# Patient Record
Sex: MALE | Race: WHITE | NOT HISPANIC OR LATINO | Employment: FULL TIME | URBAN - METROPOLITAN AREA
[De-identification: names, ages, dates, MRNs, and addresses within clinical notes are randomized per-mention and may not be internally consistent; named-entity substitution may affect disease eponyms.]

---

## 2018-04-12 ENCOUNTER — HOSPITAL ENCOUNTER (OUTPATIENT)
Dept: PREADMISSION TESTING | Facility: OTHER | Age: 68
Discharge: HOME | End: 2018-04-12
Attending: ORTHOPAEDIC SURGERY
Payer: MEDICARE

## 2018-04-12 VITALS
HEIGHT: 72 IN | SYSTOLIC BLOOD PRESSURE: 153 MMHG | OXYGEN SATURATION: 98 % | TEMPERATURE: 98 F | DIASTOLIC BLOOD PRESSURE: 93 MMHG | WEIGHT: 210 LBS | HEART RATE: 94 BPM | BODY MASS INDEX: 28.44 KG/M2

## 2018-04-12 DIAGNOSIS — I10 HTN (HYPERTENSION): ICD-10-CM

## 2018-04-12 LAB
ANION GAP SERPL CALC-SCNC: 8 MMOL/L
BASOPHILS # BLD AUTO: 0.04 K/UL
BASOPHILS NFR BLD: 0.4 %
BUN SERPL-MCNC: 22 MG/DL
CALCIUM SERPL-MCNC: 9.7 MG/DL
CHLORIDE SERPL-SCNC: 108 MMOL/L
CO2 SERPL-SCNC: 26 MMOL/L
CREAT SERPL-MCNC: 1.1 MG/DL
DIFFERENTIAL METHOD: ABNORMAL
EOSINOPHIL # BLD AUTO: 0.4 K/UL
EOSINOPHIL NFR BLD: 4.2 %
ERYTHROCYTE [DISTWIDTH] IN BLOOD BY AUTOMATED COUNT: 13.7 %
EST. GFR  (AFRICAN AMERICAN): >60 ML/MIN/1.73 M^2
EST. GFR  (NON AFRICAN AMERICAN): >60 ML/MIN/1.73 M^2
GLUCOSE SERPL-MCNC: 89 MG/DL
HCT VFR BLD AUTO: 40.6 %
HGB BLD-MCNC: 13.6 G/DL
LYMPHOCYTES # BLD AUTO: 1.7 K/UL
LYMPHOCYTES NFR BLD: 18.5 %
MCH RBC QN AUTO: 32.6 PG
MCHC RBC AUTO-ENTMCNC: 33.5 G/DL
MCV RBC AUTO: 97 FL
MONOCYTES # BLD AUTO: 0.7 K/UL
MONOCYTES NFR BLD: 7.4 %
NEUTROPHILS # BLD AUTO: 6.5 K/UL
NEUTROPHILS NFR BLD: 69.3 %
PLATELET # BLD AUTO: 186 K/UL
PMV BLD AUTO: 10.1 FL
POTASSIUM SERPL-SCNC: 4.6 MMOL/L
RBC # BLD AUTO: 4.17 M/UL
SODIUM SERPL-SCNC: 142 MMOL/L
WBC # BLD AUTO: 9.37 K/UL

## 2018-04-12 PROCEDURE — 85025 COMPLETE CBC W/AUTO DIFF WBC: CPT

## 2018-04-12 PROCEDURE — 36415 COLL VENOUS BLD VENIPUNCTURE: CPT

## 2018-04-12 PROCEDURE — 93010 ELECTROCARDIOGRAM REPORT: CPT | Mod: ,,, | Performed by: INTERNAL MEDICINE

## 2018-04-12 PROCEDURE — 80048 BASIC METABOLIC PNL TOTAL CA: CPT

## 2018-04-12 PROCEDURE — 93005 ELECTROCARDIOGRAM TRACING: CPT

## 2018-04-12 RX ORDER — LEVOTHYROXINE SODIUM 75 UG/1
75 TABLET ORAL DAILY
COMMUNITY

## 2018-04-12 RX ORDER — LISINOPRIL 20 MG/1
10 TABLET ORAL DAILY
COMMUNITY

## 2018-04-12 RX ORDER — SODIUM CHLORIDE, SODIUM LACTATE, POTASSIUM CHLORIDE, CALCIUM CHLORIDE 600; 310; 30; 20 MG/100ML; MG/100ML; MG/100ML; MG/100ML
INJECTION, SOLUTION INTRAVENOUS CONTINUOUS
Status: CANCELLED | OUTPATIENT
Start: 2018-04-12

## 2018-04-12 RX ORDER — SIMVASTATIN 20 MG/1
20 TABLET, FILM COATED ORAL NIGHTLY
COMMUNITY

## 2018-04-12 RX ORDER — FAMOTIDINE 20 MG/1
20 TABLET, FILM COATED ORAL
Status: CANCELLED | OUTPATIENT
Start: 2018-04-12 | End: 2018-04-12

## 2018-04-12 NOTE — DISCHARGE INSTRUCTIONS
PRE-ADMIT TESTING -  [phone]    [address]  [place] [facility]          Your surgery has been scheduled at [facility]. We are pleased to have the opportunity to serve you. For Further Information please call [phone].    On the day of surgery please report to the Information Desk on the 1st floor.    · CONTACT YOUR PHYSICIAN'S OFFICE THE DAY PRIOR TO YOUR SURGERY TO OBTAIN YOUR ARRIVAL TIME.     · The evening before surgery do not eat anything after 9 p.m. ( this includes hard candy, chewing gum and mints).  You may only have GATORADE, POWERADE AND WATER  from 9 p.m. until you leave your home.   DO NOT DRINK ANY LIQUIDS ON THE WAY TO THE HOSPITAL.      SPECIAL MEDICATION INSTRUCTIONS: TAKE medications checked off by the Anesthesiologist on your Medication List.    Angiogram Patients: Take medications as instructed by your physician, including aspirin.     Surgery Patients:    If you take ASPIRIN - Your PHYSICIAN/SURGEON will need to inform you IF/OR when you need to stop taking aspirin prior to your surgery.     Do Not take any medications containing IBUPROFEN.  Do Not Wear any make-up or dark nail polish   (especially eye make-up) to surgery. If you come to surgery with makeup on you will be required to remove the makeup or nail polish.    Do not shave your surgical area at least 5 days prior to your surgery. The surgical prep will be performed at the hospital according to Infection Control regulations.    Leave all valuables at home.   Do Not wear any jewelry or watches, including any metal in body piercings.  Contact Lens must be removed before surgery. Either do not wear the contact lens or bring a case and solution for storage.  Please bring a container for eyeglasses or dentures as required.  Bring any paperwork your physician has provided, such as consent forms,  history and physicals, doctor's orders, etc.   Bring comfortable clothes that are loose fitting to wear upon  discharge. Take into consideration the type of surgery being performed.  Maintain your diet as advised per your physician the day prior to surgery.      Adequate rest the night before surgery is advised.   Park in the Parking lot behind the hospital or in the [place] across the street from the parking lot. Parking is complimentary.  If you will be discharged the same day as your procedure, please arrange for a responsible adult to drive you home or to accompany you if traveling by taxi.   YOU WILL NOT BE PERMITTED TO DRIVE OR TO LEAVE THE HOSPITAL ALONE AFTER SURGERY.   It is strongly recommended that you arrange for someone to remain with you for the first 24 hrs following your surgery.       Thank you for your cooperation.  The Staff of [facility].        Bathing Instructions                                                                 Please shower the evening before and morning of your procedure with    ANTIBACTERIAL SOAP. ( DIAL, etc )  Concentrate on the surgical area   for at least 3 minutes and rinse completely. Dry off as usual.   Do not use any deodorant, powder, body lotions, perfume, after shave or    cologne.

## 2018-04-12 NOTE — PRE-PROCEDURE INSTRUCTIONS
Mr. Anguiano called requesting labs be faxed to Dr. Brooks (PCP), he has an appt for 2 pm today. Labs and EKG faxed as requested.

## 2018-04-12 NOTE — PRE-PROCEDURE INSTRUCTIONS
Labs and EKG done, A fib noted on EKG. Dr. White viewed EKG and spoke with pt and his wife. Anesthesia requesting cardiac clearance, pt verbalized understanding and to schedule cardiology appt and inform us of appt date and where to fax EKG and labs.

## 2018-06-07 ENCOUNTER — HOSPITAL ENCOUNTER (OUTPATIENT)
Dept: PREADMISSION TESTING | Facility: OTHER | Age: 68
Discharge: HOME | End: 2018-06-07
Attending: ORTHOPAEDIC SURGERY
Payer: MEDICARE

## 2018-06-07 ENCOUNTER — ANESTHESIA EVENT (OUTPATIENT)
Dept: SURGERY | Facility: OTHER | Age: 68
End: 2018-06-07
Payer: MEDICARE

## 2018-06-07 VITALS
TEMPERATURE: 98 F | DIASTOLIC BLOOD PRESSURE: 87 MMHG | SYSTOLIC BLOOD PRESSURE: 157 MMHG | HEIGHT: 72 IN | OXYGEN SATURATION: 99 % | BODY MASS INDEX: 28.44 KG/M2 | WEIGHT: 210 LBS | HEART RATE: 89 BPM

## 2018-06-07 RX ORDER — FAMOTIDINE 20 MG/1
20 TABLET, FILM COATED ORAL
Status: CANCELLED | OUTPATIENT
Start: 2018-06-07 | End: 2018-06-07

## 2018-06-07 RX ORDER — METOPROLOL TARTRATE 25 MG/1
25 TABLET, FILM COATED ORAL
COMMUNITY
End: 2018-10-09 | Stop reason: CLARIF

## 2018-06-07 RX ORDER — SODIUM CHLORIDE, SODIUM LACTATE, POTASSIUM CHLORIDE, CALCIUM CHLORIDE 600; 310; 30; 20 MG/100ML; MG/100ML; MG/100ML; MG/100ML
INJECTION, SOLUTION INTRAVENOUS CONTINUOUS
Status: CANCELLED | OUTPATIENT
Start: 2018-06-07

## 2018-06-07 RX ORDER — PREGABALIN 75 MG/1
150 CAPSULE ORAL
Status: DISPENSED | OUTPATIENT
Start: 2018-06-07 | End: 2018-06-07

## 2018-06-07 NOTE — DISCHARGE INSTRUCTIONS
PRE-ADMIT TESTING -  [phone]    [address]  [place] [facility]          Your surgery has been scheduled at [facility]. We are pleased to have the opportunity to serve you. For Further Information please call [phone].    On the day of surgery please report to the Information Desk on the 1st floor.    · CONTACT YOUR PHYSICIAN'S OFFICE THE DAY PRIOR TO YOUR SURGERY TO OBTAIN YOUR ARRIVAL TIME.     · The evening before surgery do not eat anything after 9 p.m. ( this includes hard candy, chewing gum and mints).  You may only have GATORADE, POWERADE AND WATER  from 9 p.m. until you leave your home.   DO NOT DRINK ANY LIQUIDS ON THE WAY TO THE HOSPITAL.      SPECIAL MEDICATION INSTRUCTIONS: TAKE medications checked off by the Anesthesiologist on your Medication List.    Angiogram Patients: Take medications as instructed by your physician, including aspirin.     Surgery Patients:    If you take ASPIRIN - Your PHYSICIAN/SURGEON will need to inform you IF/OR when you need to stop taking aspirin prior to your surgery.     Do Not take any medications containing IBUPROFEN.  Do Not Wear any make-up or dark nail polish   (especially eye make-up) to surgery. If you come to surgery with makeup on you will be required to remove the makeup or nail polish.    Do not shave your surgical area at least 5 days prior to your surgery. The surgical prep will be performed at the hospital according to Infection Control regulations.    Leave all valuables at home.   Do Not wear any jewelry or watches, including any metal in body piercings.  Contact Lens must be removed before surgery. Either do not wear the contact lens or bring a case and solution for storage.  Please bring a container for eyeglasses or dentures as required.  Bring any paperwork your physician has provided, such as consent forms,  history and physicals, doctor's orders, etc.   Bring comfortable clothes that are loose fitting to wear upon  discharge. Take into consideration the type of surgery being performed.  Maintain your diet as advised per your physician the day prior to surgery.      Adequate rest the night before surgery is advised.   Park in the Parking lot behind the hospital or in the [place] [facility] across the street from the parking lot. Parking is complimentary.  If you will be discharged the same day as your procedure, please arrange for a responsible adult to drive you home or to accompany you if traveling by taxi.   YOU WILL NOT BE PERMITTED TO DRIVE OR TO LEAVE THE HOSPITAL ALONE AFTER SURGERY.   It is strongly recommended that you arrange for someone to remain with you for the first 24 hrs following your surgery.       Thank you for your cooperation.  The Staff of [facility].        Bathing Instructions                                                                 Please shower the evening before and morning of your procedure with    ANTIBACTERIAL SOAP. ( DIAL, etc )  Concentrate on the surgical area   for at least 3 minutes and rinse completely. Dry off as usual.   Do not use any deodorant, powder, body lotions, perfume, after shave or    cologne.

## 2018-06-07 NOTE — ANESTHESIA PREPROCEDURE EVALUATION
"                                                                                                             06/07/2018  Aníbal Anguiano is a 67 y.o., male.    Anesthesia Evaluation    I have reviewed the Patient Summary Reports.    I have reviewed the Nursing Notes.   I have reviewed the Medications.     Review of Systems  Anesthesia Hx:  No problems with previous Anesthesia  Denies Family Hx of Anesthesia complications.   Denies Personal Hx of Anesthesia complications.   Social:  Non-Smoker    Hematology/Oncology:  Hematology Normal        Cardiovascular:   Hypertension, well controlled Dysrhythmias atrial fibrillation Hx of" extra beats"  Atrial fib found last preop surgery cancelled  Dr Schwarz cardiology at . Placed on beta blocker and eliquis and cleared for surgery    Pulmonary:  Pulmonary Normal    Renal/:  Renal/ Normal     Hepatic/GI:   PUD,    Musculoskeletal:  Musculoskeletal Normal    Neurological:  Neurology Normal    Endocrine:   Hypothyroidism        Physical Exam  General:  Well nourished    Airway/Jaw/Neck:  Airway Findings: Mouth Opening: Normal Tongue: Normal  General Airway Assessment: Adult  Mallampati: II  TM Distance: Normal, at least 6 cm         Dental:  Dental Findings:              Anesthesia Plan  Type of Anesthesia, risks & benefits discussed:  Anesthesia Type:  general  Patient's Preference:   Intra-op Monitoring Plan: standard ASA monitors  Intra-op Monitoring Plan Comments:   Post Op Pain Control Plan: peripheral nerve block and multimodal analgesia  Post Op Pain Control Plan Comments:   Induction:   IV  Beta Blocker:         Informed Consent: Patient understands risks and agrees with Anesthesia plan.  Questions answered. Anesthesia consent signed with patient.  ASA Score: 3     Day of Surgery Review of History & Physical:    H&P update referred to the surgeon.     Anesthesia Plan Notes: Atrial fibrillation found last preop and surgery canceled.  Now returns post cardiology " workup and clearance at [facility]        Ready For Surgery From Anesthesia Perspective.

## 2018-06-12 ENCOUNTER — ANESTHESIA (OUTPATIENT)
Dept: SURGERY | Facility: OTHER | Age: 68
End: 2018-06-12
Payer: MEDICARE

## 2018-06-12 ENCOUNTER — HOSPITAL ENCOUNTER (OUTPATIENT)
Facility: OTHER | Age: 68
Discharge: HOME | End: 2018-06-12
Attending: ORTHOPAEDIC SURGERY | Admitting: ORTHOPAEDIC SURGERY
Payer: MEDICARE

## 2018-06-12 VITALS
OXYGEN SATURATION: 96 % | BODY MASS INDEX: 28.44 KG/M2 | TEMPERATURE: 98 F | WEIGHT: 210 LBS | HEART RATE: 81 BPM | SYSTOLIC BLOOD PRESSURE: 118 MMHG | RESPIRATION RATE: 16 BRPM | DIASTOLIC BLOOD PRESSURE: 75 MMHG | HEIGHT: 72 IN

## 2018-06-12 DIAGNOSIS — M75.121 COMPLETE ROTATOR CUFF TEAR OR RUPTURE OF RIGHT SHOULDER, NOT SPECIFIED AS TRAUMATIC: Primary | ICD-10-CM

## 2018-06-12 PROCEDURE — 37000008 HC ANESTHESIA 1ST 15 MINUTES: Performed by: ORTHOPAEDIC SURGERY

## 2018-06-12 PROCEDURE — C1713 ANCHOR/SCREW BN/BN,TIS/BN: HCPCS | Performed by: ORTHOPAEDIC SURGERY

## 2018-06-12 PROCEDURE — 63600175 PHARM REV CODE 636 W HCPCS: Performed by: NURSE ANESTHETIST, CERTIFIED REGISTERED

## 2018-06-12 PROCEDURE — 25000003 PHARM REV CODE 250: Performed by: ANESTHESIOLOGY

## 2018-06-12 PROCEDURE — 36000711: Performed by: ORTHOPAEDIC SURGERY

## 2018-06-12 PROCEDURE — 71000016 HC POSTOP RECOV ADDL HR: Performed by: ORTHOPAEDIC SURGERY

## 2018-06-12 PROCEDURE — 63600175 PHARM REV CODE 636 W HCPCS: Performed by: SPECIALIST

## 2018-06-12 PROCEDURE — C1729 CATH, DRAINAGE: HCPCS | Performed by: ORTHOPAEDIC SURGERY

## 2018-06-12 PROCEDURE — 71000033 HC RECOVERY, INTIAL HOUR: Performed by: ORTHOPAEDIC SURGERY

## 2018-06-12 PROCEDURE — 25000003 PHARM REV CODE 250: Performed by: NURSE ANESTHETIST, CERTIFIED REGISTERED

## 2018-06-12 PROCEDURE — 37000009 HC ANESTHESIA EA ADD 15 MINS: Performed by: ORTHOPAEDIC SURGERY

## 2018-06-12 PROCEDURE — S0077 INJECTION, CLINDAMYCIN PHOSP: HCPCS | Performed by: NURSE ANESTHETIST, CERTIFIED REGISTERED

## 2018-06-12 PROCEDURE — 25000003 PHARM REV CODE 250: Performed by: SPECIALIST

## 2018-06-12 PROCEDURE — 27201423 OPTIME MED/SURG SUP & DEVICES STERILE SUPPLY: Performed by: ORTHOPAEDIC SURGERY

## 2018-06-12 PROCEDURE — 36000710: Performed by: ORTHOPAEDIC SURGERY

## 2018-06-12 PROCEDURE — 71000015 HC POSTOP RECOV 1ST HR: Performed by: ORTHOPAEDIC SURGERY

## 2018-06-12 PROCEDURE — 63600175 PHARM REV CODE 636 W HCPCS: Performed by: ORTHOPAEDIC SURGERY

## 2018-06-12 PROCEDURE — 71000039 HC RECOVERY, EACH ADD'L HOUR: Performed by: ORTHOPAEDIC SURGERY

## 2018-06-12 DEVICE — ANCHOR BIOCOMP W/3 SUTURES: Type: IMPLANTABLE DEVICE | Site: SHOULDER | Status: FUNCTIONAL

## 2018-06-12 DEVICE — ANCHOR SUT PEEK 4.75X19.1MM: Type: IMPLANTABLE DEVICE | Site: SHOULDER | Status: FUNCTIONAL

## 2018-06-12 RX ORDER — PROPOFOL 10 MG/ML
VIAL (ML) INTRAVENOUS
Status: DISCONTINUED | OUTPATIENT
Start: 2018-06-12 | End: 2018-06-12

## 2018-06-12 RX ORDER — DIPHENHYDRAMINE HYDROCHLORIDE 50 MG/ML
25 INJECTION INTRAMUSCULAR; INTRAVENOUS EVERY 6 HOURS PRN
Status: DISCONTINUED | OUTPATIENT
Start: 2018-06-12 | End: 2018-06-12 | Stop reason: HOSPADM

## 2018-06-12 RX ORDER — HYDROMORPHONE HYDROCHLORIDE 2 MG/ML
0.4 INJECTION, SOLUTION INTRAMUSCULAR; INTRAVENOUS; SUBCUTANEOUS EVERY 5 MIN PRN
Status: DISCONTINUED | OUTPATIENT
Start: 2018-06-12 | End: 2018-06-12 | Stop reason: HOSPADM

## 2018-06-12 RX ORDER — HYDROCODONE BITARTRATE AND ACETAMINOPHEN 5; 325 MG/1; MG/1
1 TABLET ORAL EVERY 4 HOURS PRN
Status: DISCONTINUED | OUTPATIENT
Start: 2018-06-12 | End: 2018-06-12 | Stop reason: HOSPADM

## 2018-06-12 RX ORDER — ACETAMINOPHEN 10 MG/ML
INJECTION, SOLUTION INTRAVENOUS
Status: DISCONTINUED | OUTPATIENT
Start: 2018-06-12 | End: 2018-06-12

## 2018-06-12 RX ORDER — GLYCOPYRROLATE 0.2 MG/ML
INJECTION INTRAMUSCULAR; INTRAVENOUS
Status: DISCONTINUED | OUTPATIENT
Start: 2018-06-12 | End: 2018-06-12

## 2018-06-12 RX ORDER — FAMOTIDINE 20 MG/1
20 TABLET, FILM COATED ORAL
Status: COMPLETED | OUTPATIENT
Start: 2018-06-12 | End: 2018-06-12

## 2018-06-12 RX ORDER — OXYCODONE HYDROCHLORIDE 5 MG/1
5 TABLET ORAL
Status: DISCONTINUED | OUTPATIENT
Start: 2018-06-12 | End: 2018-06-12 | Stop reason: HOSPADM

## 2018-06-12 RX ORDER — SODIUM CHLORIDE 0.9 % (FLUSH) 0.9 %
5 SYRINGE (ML) INJECTION
Status: DISCONTINUED | OUTPATIENT
Start: 2018-06-12 | End: 2018-06-12 | Stop reason: HOSPADM

## 2018-06-12 RX ORDER — FENTANYL CITRATE 50 UG/ML
100 INJECTION, SOLUTION INTRAMUSCULAR; INTRAVENOUS EVERY 5 MIN PRN
Status: COMPLETED | OUTPATIENT
Start: 2018-06-12 | End: 2018-06-12

## 2018-06-12 RX ORDER — LIDOCAINE HCL/PF 100 MG/5ML
SYRINGE (ML) INTRAVENOUS
Status: DISCONTINUED | OUTPATIENT
Start: 2018-06-12 | End: 2018-06-12

## 2018-06-12 RX ORDER — CLINDAMYCIN PHOSPHATE 900 MG/50ML
INJECTION, SOLUTION INTRAVENOUS
Status: DISCONTINUED | OUTPATIENT
Start: 2018-06-12 | End: 2018-06-12

## 2018-06-12 RX ORDER — CLINDAMYCIN PHOSPHATE 900 MG/50ML
900 INJECTION, SOLUTION INTRAVENOUS
Status: DISCONTINUED | OUTPATIENT
Start: 2018-06-12 | End: 2018-06-12 | Stop reason: HOSPADM

## 2018-06-12 RX ORDER — OXYCODONE HYDROCHLORIDE 5 MG/1
10 TABLET ORAL EVERY 4 HOURS PRN
Status: DISCONTINUED | OUTPATIENT
Start: 2018-06-12 | End: 2018-06-12 | Stop reason: HOSPADM

## 2018-06-12 RX ORDER — ONDANSETRON HYDROCHLORIDE 2 MG/ML
INJECTION, SOLUTION INTRAMUSCULAR; INTRAVENOUS
Status: DISCONTINUED | OUTPATIENT
Start: 2018-06-12 | End: 2018-06-12

## 2018-06-12 RX ORDER — EPINEPHRINE 1 MG/ML
INJECTION, SOLUTION INTRACARDIAC; INTRAMUSCULAR; INTRAVENOUS; SUBCUTANEOUS
Status: DISCONTINUED | OUTPATIENT
Start: 2018-06-12 | End: 2018-06-12 | Stop reason: HOSPADM

## 2018-06-12 RX ORDER — ROPIVACAINE HYDROCHLORIDE 5 MG/ML
INJECTION, SOLUTION EPIDURAL; INFILTRATION; PERINEURAL
Status: DISCONTINUED | OUTPATIENT
Start: 2018-06-12 | End: 2018-06-12

## 2018-06-12 RX ORDER — ROCURONIUM BROMIDE 10 MG/ML
INJECTION, SOLUTION INTRAVENOUS
Status: DISCONTINUED | OUTPATIENT
Start: 2018-06-12 | End: 2018-06-12

## 2018-06-12 RX ORDER — FENTANYL CITRATE 50 UG/ML
25 INJECTION, SOLUTION INTRAMUSCULAR; INTRAVENOUS EVERY 5 MIN PRN
Status: DISCONTINUED | OUTPATIENT
Start: 2018-06-12 | End: 2018-06-12 | Stop reason: HOSPADM

## 2018-06-12 RX ORDER — NEOSTIGMINE METHYLSULFATE 1 MG/ML
INJECTION, SOLUTION INTRAVENOUS
Status: DISCONTINUED | OUTPATIENT
Start: 2018-06-12 | End: 2018-06-12

## 2018-06-12 RX ORDER — ONDANSETRON 2 MG/ML
4 INJECTION INTRAMUSCULAR; INTRAVENOUS EVERY 12 HOURS PRN
Status: DISCONTINUED | OUTPATIENT
Start: 2018-06-12 | End: 2018-06-12 | Stop reason: HOSPADM

## 2018-06-12 RX ORDER — ONDANSETRON 2 MG/ML
4 INJECTION INTRAMUSCULAR; INTRAVENOUS DAILY PRN
Status: DISCONTINUED | OUTPATIENT
Start: 2018-06-12 | End: 2018-06-12 | Stop reason: HOSPADM

## 2018-06-12 RX ORDER — SODIUM CHLORIDE, SODIUM LACTATE, POTASSIUM CHLORIDE, CALCIUM CHLORIDE 600; 310; 30; 20 MG/100ML; MG/100ML; MG/100ML; MG/100ML
INJECTION, SOLUTION INTRAVENOUS CONTINUOUS
Status: DISCONTINUED | OUTPATIENT
Start: 2018-06-12 | End: 2018-06-12 | Stop reason: HOSPADM

## 2018-06-12 RX ORDER — MEPERIDINE HYDROCHLORIDE 50 MG/ML
12.5 INJECTION INTRAMUSCULAR; INTRAVENOUS; SUBCUTANEOUS ONCE AS NEEDED
Status: DISCONTINUED | OUTPATIENT
Start: 2018-06-12 | End: 2018-06-12 | Stop reason: HOSPADM

## 2018-06-12 RX ORDER — SODIUM CHLORIDE 0.9 % (FLUSH) 0.9 %
3 SYRINGE (ML) INJECTION
Status: DISCONTINUED | OUTPATIENT
Start: 2018-06-12 | End: 2018-06-12 | Stop reason: HOSPADM

## 2018-06-12 RX ORDER — DEXAMETHASONE SODIUM PHOSPHATE 4 MG/ML
INJECTION, SOLUTION INTRA-ARTICULAR; INTRALESIONAL; INTRAMUSCULAR; INTRAVENOUS; SOFT TISSUE
Status: DISCONTINUED | OUTPATIENT
Start: 2018-06-12 | End: 2018-06-12

## 2018-06-12 RX ORDER — PHENYLEPHRINE HYDROCHLORIDE 10 MG/ML
INJECTION INTRAVENOUS
Status: DISCONTINUED | OUTPATIENT
Start: 2018-06-12 | End: 2018-06-12

## 2018-06-12 RX ORDER — MIDAZOLAM HYDROCHLORIDE 1 MG/ML
2 INJECTION INTRAMUSCULAR; INTRAVENOUS
Status: COMPLETED | OUTPATIENT
Start: 2018-06-12 | End: 2018-06-12

## 2018-06-12 RX ADMIN — FAMOTIDINE 20 MG: 20 TABLET ORAL at 10:06

## 2018-06-12 RX ADMIN — PHENYLEPHRINE HYDROCHLORIDE 0.5 MCG: 10 INJECTION INTRAVENOUS at 02:06

## 2018-06-12 RX ADMIN — PHENYLEPHRINE HYDROCHLORIDE 100 MCG: 10 INJECTION INTRAVENOUS at 02:06

## 2018-06-12 RX ADMIN — PROPOFOL 200 MG: 10 INJECTION, EMULSION INTRAVENOUS at 02:06

## 2018-06-12 RX ADMIN — ROCURONIUM BROMIDE 50 MG: 10 INJECTION, SOLUTION INTRAVENOUS at 02:06

## 2018-06-12 RX ADMIN — FENTANYL CITRATE 100 MCG: 50 INJECTION, SOLUTION INTRAMUSCULAR; INTRAVENOUS at 12:06

## 2018-06-12 RX ADMIN — MIDAZOLAM HYDROCHLORIDE 2 MG: 1 INJECTION, SOLUTION INTRAMUSCULAR; INTRAVENOUS at 12:06

## 2018-06-12 RX ADMIN — DEXAMETHASONE SODIUM PHOSPHATE 8 MG: 4 INJECTION, SOLUTION INTRAMUSCULAR; INTRAVENOUS at 02:06

## 2018-06-12 RX ADMIN — NEOSTIGMINE METHYLSULFATE 4 MG: 1 INJECTION INTRAVENOUS at 04:06

## 2018-06-12 RX ADMIN — GLYCOPYRROLATE 0.6 MG: 0.2 INJECTION, SOLUTION INTRAMUSCULAR; INTRAVENOUS at 04:06

## 2018-06-12 RX ADMIN — CLINDAMYCIN PHOSPHATE 900 MG: 18 INJECTION, SOLUTION INTRAVENOUS at 02:06

## 2018-06-12 RX ADMIN — FENTANYL CITRATE 50 MCG: 50 INJECTION, SOLUTION INTRAMUSCULAR; INTRAVENOUS at 02:06

## 2018-06-12 RX ADMIN — ONDANSETRON 4 MG: 2 INJECTION, SOLUTION INTRAMUSCULAR; INTRAVENOUS at 03:06

## 2018-06-12 RX ADMIN — SODIUM CHLORIDE, SODIUM LACTATE, POTASSIUM CHLORIDE, AND CALCIUM CHLORIDE: 600; 310; 30; 20 INJECTION, SOLUTION INTRAVENOUS at 02:06

## 2018-06-12 RX ADMIN — ACETAMINOPHEN 1000 MG: 10 INJECTION, SOLUTION INTRAVENOUS at 02:06

## 2018-06-12 RX ADMIN — PHENYLEPHRINE HYDROCHLORIDE 100 MCG: 10 INJECTION INTRAVENOUS at 03:06

## 2018-06-12 RX ADMIN — LIDOCAINE HYDROCHLORIDE 50 MG: 20 INJECTION, SOLUTION INTRAVENOUS at 02:06

## 2018-06-12 RX ADMIN — PROPOFOL 50 MG: 10 INJECTION, EMULSION INTRAVENOUS at 02:06

## 2018-06-12 RX ADMIN — CARBOXYMETHYLCELLULOSE SODIUM 2 DROP: 2.5 SOLUTION/ DROPS OPHTHALMIC at 02:06

## 2018-06-12 RX ADMIN — ROPIVACAINE HYDROCHLORIDE 20 ML: 5 INJECTION, SOLUTION EPIDURAL; INFILTRATION; PERINEURAL at 12:06

## 2018-06-12 RX ADMIN — OXYCODONE HYDROCHLORIDE 5 MG: 5 TABLET ORAL at 05:06

## 2018-06-12 RX ADMIN — SODIUM CHLORIDE, SODIUM LACTATE, POTASSIUM CHLORIDE, AND CALCIUM CHLORIDE: 600; 310; 30; 20 INJECTION, SOLUTION INTRAVENOUS at 12:06

## 2018-06-12 NOTE — ANESTHESIA PROCEDURE NOTES
Peripheral    Patient location during procedure: holding area   Block not for primary anesthetic.  Reason for block: at surgeon's request and post-op pain management   Post-op Pain Location: R Shoulder  Start time: 6/12/2018 12:29 PM  Timeout: 6/12/2018 12:29 PM   End time: 6/12/2018 12:34 PM  Surgery related to: Pain  Staffing  Anesthesiologist: ZAIDA BARBA  Performed: anesthesiologist   Preanesthetic Checklist  Completed: patient identified, site marked, surgical consent, pre-op evaluation, timeout performed, IV checked, risks and benefits discussed and monitors and equipment checked  Peripheral Block  Patient position: supine  Prep: ChloraPrep and site prepped and draped  Patient monitoring: cardiac monitor, continuous pulse ox and frequent blood pressure checks  Block type: interscalene  Laterality: right  Injection technique: single shot  Needle  Needle type: Stimuplex   Needle gauge: 21 G  Needle length: 3.5 in  Needle localization: anatomical landmarks, nerve stimulator and ultrasound guidance   -ultrasound image captured on disc.  Assessment  Injection assessment: negative aspiration, negative parasthesia and local visualized surrounding nerve  Paresthesia pain: none  Heart rate change: no  Slow fractionated injection: yes  Medications:  Bolus administered: 20 mL of 0.5 ropivacaine  Epinephrine added: none

## 2018-06-12 NOTE — ANESTHESIA POSTPROCEDURE EVALUATION
Anesthesia Post Evaluation    Patient: Aníbal Anguiano    Procedure(s) Performed: Procedure(s) (LRB):  ARTHROSCOPY-SHOULDER (Right)  REPAIR-ROTATOR CUFF (Right)  ARTHROSCOPY-SHOULDER WITH SUBACROMIAL DECOMPRESSION (Right)  CLAVICULECTOMY, DISTAL-DCE (Right)    Final Anesthesia Type: general  Patient location during evaluation: PACU  Patient participation: Yes- Able to Participate  Level of consciousness: awake and alert  Post-procedure vital signs: reviewed and stable  Pain management: adequate  Airway patency: patent  PONV status at discharge: No PONV  Anesthetic complications: no      Cardiovascular status: blood pressure returned to baseline  Respiratory status: unassisted and spontaneous ventilation  Hydration status: euvolemic  Follow-up not needed.        Visit Vitals  /65   Pulse 96   Temp 36.5 °C (97.7 °F) (Oral)   Resp 18   Ht 6' (1.829 m)   Wt 95.3 kg (210 lb)   SpO2 96%   BMI 28.48 kg/m²       Pain/Amilcar Score: Pain Assessment Performed: Yes (6/12/2018  5:00 PM)  Presence of Pain: complains of pain/discomfort (6/12/2018  5:00 PM)  Pain Rating Prior to Med Admin: 4 (6/12/2018  5:04 PM)  Pain Rating Post Med Admin: 3 (6/12/2018  5:00 PM)  Amilcar Score: 10 (6/12/2018  5:00 PM)

## 2018-06-12 NOTE — TRANSFER OF CARE
Anesthesia Transfer of Care Note    Patient: Aníbal Anguiano    Procedure(s) Performed: Procedure(s) (LRB):  ARTHROSCOPY-SHOULDER (Right)  REPAIR-ROTATOR CUFF (Right)  ARTHROSCOPY-SHOULDER WITH SUBACROMIAL DECOMPRESSION (Right)  CLAVICULECTOMY, DISTAL-DCE (Right)    Patient location: PACU    Anesthesia Type: general    Transport from OR: Transported from OR on room air with adequate spontaneous ventilation    Post assessment: no apparent anesthetic complications    Level of consciousness: awake    Complications: none    Transfer of care protocol was followed      Last vitals:   Visit Vitals  /83 (BP Location: Left arm, Patient Position: Lying)   Pulse 68   Temp 36.5 °C (97.7 °F) (Oral)   Resp 20   Ht 6' (1.829 m)   Wt 95.3 kg (210 lb)   SpO2 99%   BMI 28.48 kg/m²

## 2018-06-12 NOTE — PLAN OF CARE
Aníbal Anguiano has met all discharge criteria from Phase II. Vital Signs are stable, ambulating  without difficulty. Discharge instructions given, patient verbalized understanding. Discharged from facility via wheelchair in stable condition.

## 2018-06-12 NOTE — BRIEF OP NOTE
[facility]  Brief Operative Note     SUMMARY     Surgery Date: 6/12/2018     Surgeon(s) and Role:     * Melvin Camargo MD - Primary    Assisting Surgeon: None    Pre-op Diagnosis:  Complete rotator cuff tear or rupture of right shoulder, not specified as traumatic [M75.121]  Coracoid impingement of right shoulder [M75.41]    Post-op Diagnosis:  Post-Op Diagnosis Codes:     * Complete rotator cuff tear or rupture of right shoulder, not specified as traumatic [M75.121]     * Coracoid impingement of right shoulder [M75.41]    Procedure: right shoulder ATS SAD, RCR    Anesthesia: General + Regional    Description of the findings of the procedure: massive retracted RTC tear    Findings/Key Components: same    Estimated Blood Loss: 3cc         Specimens:   Specimen (12h ago through future)    None          Discharge Note    SUMMARY     Admit Date: 6/12/2018    Discharge Date and Time:  06/12/2018 4:24 PM    Hospital Course (synopsis of major diagnoses, care, treatment, and services provided during the course of the hospital stay): outpt     Final Diagnosis: Post-Op Diagnosis Codes:     * Complete rotator cuff tear or rupture of right shoulder, not specified as traumatic [M75.121]     * Coracoid impingement of right shoulder [M75.41]    Disposition: Home or Self Care    Follow Up/Patient Instructions:     Medications:  Reconciled Home Medications:      Medication List      CONTINUE taking these medications    ELIQUIS 5 mg Tab  Generic drug:  apixaban  Take 5 mg by mouth 2 (two) times daily. Instructed by cardiologist, Dr. Schwarz, to stop 4-5 days prior to surgery     levothyroxine 75 MCG tablet  Commonly known as:  SYNTHROID  Take 75 mcg by mouth once daily.     lisinopril 20 MG tablet  Commonly known as:  PRINIVIL,ZESTRIL  Take 10 mg by mouth once daily.     metoprolol tartrate 25 MG tablet  Commonly known as:  LOPRESSOR  Take 25 mg by mouth. Pt takes at 10 am     simvastatin 20 MG tablet  Commonly  known as:  ZOCOR  Take 20 mg by mouth every evening.            Discharge Procedure Orders  Diet general     Call MD for:  temperature >100.4     Call MD for:  persistent nausea and vomiting     Call MD for:  severe uncontrolled pain     Call MD for:  difficulty breathing, headache or visual disturbances     Call MD for:  redness, tenderness, or signs of infection (pain, swelling, redness, odor or green/yellow discharge around incision site)     Call MD for:  hives     Call MD for:  persistent dizziness or light-headedness     Call MD for:  extreme fatigue     Lifting restrictions     Ice to affected area     Keep surgical extremity elevated     Change dressing (specify)   Order Comments: Dressing change first in PT, then daily       Follow-up Information     Melvin Vazquez MD. Schedule an appointment as soon as possible for a visit in 10 days.    Specialty:  Orthopedic Surgery  Why:  For suture removal, For wound re-check  Contact information:  [address] [place] [id number] [phone]

## 2018-06-12 NOTE — DISCHARGE INSTRUCTIONS
NO HEAVY LIFTING      Shoulder Arthroscopy Post-Op Instructions                                        Dr. Melvin Camargo    1. Sling/Brace- You should wear the brace until the first post-op visit. You can take the sling off to shower but keep your arm at your side.  Do not lift your arm away from your body unless told otherwise.  I will give you/your family specific instructions about the length of brace wear.    2. Bandage- If you have physical therapy set up they will remove the bandage. Otherwise you can remove it in 3 days. Keep the incisions clean, dry and covered. Do not get it wet until you see me.     3. Ice- Use the polar care as much as you want. Make sure there are clothes or a towel between the cooling unit and your skin.     4. Exercise- If you have PT set up, they will instruct you on exercises to do. If you do not, it is OK to lean your arm over and make circles with your hand pointing to the floor (called Pendulum exercises). Do not lift or grasp anything away from the body until you see me. It is OK to take your arm out of the sling and extend your elbow as long as your elbow is at your side.     5. Medications- You will be given pain and nausea prescriptions to go home. Take the medications as written.  Call the office if you are running low with at least 2-3 days notice to give us time to refill it.  We also recommend taking a baby aspirin for 2 weeks after surgery to decrease the (very,very low) risk of blood clot formation.    6. Bathing- Do not get your shoulder wet until you see me in clinic.    7. Appointment- You should already have your post-op appointment scheduled. If you do not or you can't remember, call the office for more information.     Discharge Instructions: After Your Surgery  Youve just had surgery. During surgery, you were given medicine called anesthesia to keep you relaxed and free of pain. After surgery, you may have some pain or nausea. This is common. Here are some tips  for feeling better and getting well after surgery.     Stay on schedule with your medicine.     Going home  Your healthcare provider will show you how to take care of yourself when you go home. He or she will also answer your questions. Have an adult family member or friend drive you home. For the first 24 hours after your surgery:    · Do not drive or use heavy equipment.  · Do not make important decisions or sign legal papers.  · Do not drink alcohol.  · Have someone stay with you, if needed. He or she can watch for problems and help keep you safe.    Be sure to go to all follow-up visits with your healthcare provider. And rest after your surgery for as long as your healthcare provider tells you to.    Coping with pain  If you have pain after surgery, pain medicine will help you feel better. Take it as told, before pain becomes severe. Also, ask your healthcare provider or pharmacist about other ways to control pain. This might be with heat, ice, or relaxation. And follow any other instructions your surgeon or nurse gives you.    Tips for taking pain medicine  To get the best relief possible, remember these points:    · Pain medicines can upset your stomach. Taking them with a little food may help.  · Most pain relievers taken by mouth need at least 20 to 30 minutes to start to work.  · Taking medicine on a schedule can help you remember to take it. Try to time your medicine so that you can take it before starting an activity. This might be before you get dressed, go for a walk, or sit down for dinner.  · Constipation is a common side effect of pain medicines. Call your healthcare provider before taking any medicines such as laxatives or stool softeners to help ease constipation. Also ask if you should skip any foods. Drinking lots of fluids and eating foods such as fruits and vegetables that are high in fiber can also help. Remember, do not take laxatives unless your surgeon has prescribed them.  · Drinking  alcohol and taking pain medicine can cause dizziness and slow your breathing. It can even be deadly. Do not drink alcohol while taking pain medicine.  · Pain medicine can make you react more slowly to things. Do not drive or run machinery while taking pain medicine.    Your healthcare provider may tell you to take acetaminophen to help ease your pain. Ask him or her how much you are supposed to take each day. Acetaminophen or other pain relievers may interact with your prescription medicines or other over-the-counter (OTC) medicines. Some prescription medicines have acetaminophen and other ingredients. Using both prescription and OTC acetaminophen for pain can cause you to overdose. Read the labels on your OTC medicines with care. This will help you to clearly know the list of ingredients, how much to take, and any warnings. It may also help you not take too much acetaminophen. If you have questions or do not understand the information, ask your pharmacist or healthcare provider to explain it to you before you take the OTC medicine.    Managing nausea  Some people have an upset stomach after surgery. This is often because of anesthesia, pain, or pain medicine, or the stress of surgery. These tips will help you handle nausea and eat healthy foods as you get better. If you were on a special food plan before surgery, ask your healthcare provider if you should follow it while you get better. These tips may help:    · Do not push yourself to eat. Your body will tell you when to eat and how much.  · Start off with clear liquids and soup. They are easier to digest.  · Next try semi-solid foods, such as mashed potatoes, applesauce, and gelatin, as you feel ready.  · Slowly move to solid foods. Dont eat fatty, rich, or spicy foods at first.  · Do not force yourself to have 3 large meals a day. Instead eat smaller amounts more often.  · Take pain medicines with a small amount of solid food, such as crackers or toast, to  avoid nausea.     Call your surgeon if  · You still have pain an hour after taking medicine. The medicine may not be strong enough.  · You feel too sleepy, dizzy, or groggy. The medicine may be too strong.  · You have side effects like nausea, vomiting, or skin changes, such as rash, itching, or hives.       If you have obstructive sleep apnea  You were given anesthesia medicine during surgery to keep you comfortable and free of pain. After surgery, you may have more apnea spells because of this medicine and other medicines you were given. The spells may last longer than usual.   At home:    · Keep using the continuous positive airway pressure (CPAP) device when you sleep. Unless your healthcare provider tells you not to, use it when you sleep, day or night. CPAP is a common device used to treat obstructive sleep apnea.  · Talk with your provider before taking any pain medicine, muscle relaxants, or sedatives. Your provider will tell you about the possible dangers of taking these medicines.    © 1107-6260 [employer]. [address] [place] [address], [place] [address]. All rights reserved. This information is not intended as a substitute for professional medical care. Always follow your healthcare professional's instructions.    PLEASE FOLLOW ANY OTHER INSTRUCTIONS PROVIDED TO YOU BY DR. JACOBSON!

## 2018-06-13 NOTE — OP NOTE
DATE OF PROCEDURE:  06/12/2018.    PREOPERATIVE DIAGNOSES:  1.  Right shoulder massive rotator cuff tear.  2.  Right shoulder impingement syndrome.  3.  Right shoulder long head/proximal biceps tendon tear.  4.  Right shoulder partial thickness subscapularis tear.    POSTOPERATIVE DIAGNOSES:  1.  Right shoulder massive rotator cuff tear.  2.  Right shoulder impingement syndrome.  3.  Right shoulder long head/proximal biceps tendon tear.  4.  Right shoulder partial thickness subscapularis tear.    PROCEDURES PERFORMED:  1.  Right shoulder arthroscopic rotator cuff repair.  2.  Right shoulder arthroscopic subacromial decompression (CA ligament   recession, acromioplasty, bursectomy).  3.  Right shoulder arthroscopic subscapularis debridement.    SURGEON:  Melvin Camargo M.D.    ASSISTANT:  Hope Salgado CST.    COMPLICATIONS:  None.    SPECIMENS:  None.    ESTIMATED BLOOD LOSS:  3 mL.    TOURNIQUET TIME:  None.    IMPLANTS USED:  Arthrex 5.5 mm triple-loaded corkscrew anchor x1 for medial row   repair, two 4.75 mm SwiveLock anchors for lateral row repair and three free   FiberWire sutures are marginal convergence and traction suture purposes.    POSTOPERATIVE PLAN:  The patient will follow a type 2 rotator cuff repair   protocol.    ANESTHESIA:  General endotracheal anesthesia plus regional.    HISTORY:  Aníbal Anguiano is a very pleasant 67-year-old gentleman who presented   to the office with persistent right shoulder pain and weakness.  Preoperative   workup including MRI showed the above pathology.  All risks and benefits were   discussed at length and informed consent was obtained for the above procedure.    PROCEDURE IN DETAIL:  Aníbal Anguiano was taken to the Operating Room on   06/12/2018 for planned right shoulder arthroscopy.  He was given 900 mg of   clindamycin as well as a regional nerve block by the Anesthesia Service within 1   hour of the incision.  He was brought to the Operating Room and placed in the    supine position.  General endotracheal anesthesia was administered.  Examination   under anesthesia revealed no pathologic laxity and symmetric passive motion.    He was then placed in the lateral decubitus position with all bony prominences   padded appropriately in an axillary roll.  Approximately 13 pounds of suspension   were used with a shoulder suspension device.  His right shoulder was then   prepped and draped in the usual sterile fashion.  A time-out procedure was   performed identifying the right shoulder as the surgical site.  A 30 mL of   normal saline were then injected in the glenohumeral joint to aid in capsular   distension and a standard posterior portal was established.  This was then   followed by an anterior portal established under direct visualization with   spinal needle localization.  Diagnostic arthroscopy then proceeded.    There was extensive synovitis within the rotator interval and some low-grade   partial thickness fraying of the superior one-third of the subscapularis.  The   electrocautery device and the shaver were used to trim back the synovitis as   well as any of the frayed subscapularis fibers.  The subscapularis however, was   in good condition and did not require repair.  A massive full-thickness tear was   immediately identified when looking superiorly with retraction just lateral to   the glenoid.  An interesting tear configuration, which was better identified   from the lateral portal later in the case, but it was a delaminated tear   posteriorly and then more anteriorly in the supraspinatus region, it actually   had flipped over in that the tear was almost like a retracted distal biceps   rupture and that it had rolled up on itself and nearly incarcerated itself at   the level of the glenoid between the glenoid and the acromion.  The remainder of   the intra-articular shoulder was unremarkable with good cartilage quality and   no evidence of pathology in the axillary  pouch.    The scope was then placed into the subacromial space and a lateral portal was   established and a thorough bursectomy was performed helping to allow better   visualization as there was very thick and chronic appearing bursal tissue.    Given that the supraspinatus, at first I thought it was completely retracted,   but in fact it had rolled up on itself and incarcerated itself underneath the   acromion, so I then did an acromioplasty from a type 3 morphology back to a type   1 morphology to give me some room as well as a CA ligament recession and then   with the Scorpion, I placed a traction suture and the supraspinatus and the   anterior portion of the infraspinatus was seen to unfurl itself.  It actually   had fairly good mobility and good tendon quality except for the leading edge of   the supraspinatus where it looked like there was some yellowish   calcification/fibrotic type changes, but this seemed to be just a leading edge   and the majority of the tendon actually looked to be in pretty good shape.  I   kept the traction suture in and using to pull from the lateral portal outside of   the cannula.  After I debrided the articular margin/insertion site of the   rotator cuff insertion to a bleeding bony bed and through an accessory lateral   portal, I placed a 5.5 mm Arthrex corkscrew anchor.  Using the traction suture,   I was able to get a good bite with the sutures with the Scorpion in a horizontal   mattress fashion.  I placed a side-to-side convergence stitch posteriorly   closing down the L-shaped part of the tear and turning it more into a crescent   type tear.  I placed another traction suture in as well.  After the sliding   knots were tied from the medial row anchor, I then incorporated all ten of the   sutures into 2 lateral row anchors getting a nice suture bridge configuration.    The tendon laid down nicely and was moved in continuity with rotation.  The   shaver was brought into remove  any soft tissue or bony debris and the portals   were closed with 3-0 Prolene suture.  A soft dressing was then applied followed   by a PolarCare Unit and the patient was then extubated in the Operating Room and   taken to the PACU without complication.      PAPO/IN  dd: 06/13/2018 08:33:03 (CDT)  td: 06/13/2018 12:39:30 (CDT)  Doc ID   #[id number]  Job ID #[id number]    CC:

## 2018-09-26 ENCOUNTER — TELEPHONE (OUTPATIENT)
Dept: MEDSURG UNIT | Facility: OTHER | Age: 68
End: 2018-09-26

## 2018-10-09 ENCOUNTER — HOSPITAL ENCOUNTER (OUTPATIENT)
Dept: PREADMISSION TESTING | Facility: OTHER | Age: 68
Discharge: HOME | End: 2018-10-09
Attending: ORTHOPAEDIC SURGERY
Payer: MEDICARE

## 2018-10-09 ENCOUNTER — ANESTHESIA EVENT (OUTPATIENT)
Dept: SURGERY | Facility: OTHER | Age: 68
DRG: 470 | End: 2018-10-09
Payer: MEDICARE

## 2018-10-09 VITALS
DIASTOLIC BLOOD PRESSURE: 77 MMHG | HEART RATE: 60 BPM | HEIGHT: 72 IN | TEMPERATURE: 97 F | OXYGEN SATURATION: 99 % | BODY MASS INDEX: 29.8 KG/M2 | WEIGHT: 220 LBS | SYSTOLIC BLOOD PRESSURE: 154 MMHG

## 2018-10-09 LAB
ABO + RH BLD: NORMAL
BLD GP AB SCN CELLS X3 SERPL QL: NORMAL

## 2018-10-09 PROCEDURE — 86901 BLOOD TYPING SEROLOGIC RH(D): CPT

## 2018-10-09 PROCEDURE — 36415 COLL VENOUS BLD VENIPUNCTURE: CPT

## 2018-10-09 RX ORDER — SOTALOL HYDROCHLORIDE 80 MG/1
40 TABLET ORAL 2 TIMES DAILY
COMMUNITY

## 2018-10-09 RX ORDER — LIDOCAINE HYDROCHLORIDE 10 MG/ML
0.5 INJECTION, SOLUTION EPIDURAL; INFILTRATION; INTRACAUDAL; PERINEURAL ONCE
Status: CANCELLED | OUTPATIENT
Start: 2018-10-09 | End: 2018-10-09

## 2018-10-09 RX ORDER — PREGABALIN 75 MG/1
150 CAPSULE ORAL
Status: DISPENSED | OUTPATIENT
Start: 2018-10-09 | End: 2018-10-09

## 2018-10-09 RX ORDER — MIDAZOLAM HYDROCHLORIDE 1 MG/ML
2 INJECTION INTRAMUSCULAR; INTRAVENOUS
Status: CANCELLED | OUTPATIENT
Start: 2018-10-09 | End: 2018-10-09

## 2018-10-09 RX ORDER — SODIUM CHLORIDE, SODIUM LACTATE, POTASSIUM CHLORIDE, CALCIUM CHLORIDE 600; 310; 30; 20 MG/100ML; MG/100ML; MG/100ML; MG/100ML
INJECTION, SOLUTION INTRAVENOUS CONTINUOUS
Status: CANCELLED | OUTPATIENT
Start: 2018-10-09

## 2018-10-09 NOTE — DISCHARGE INSTRUCTIONS
PRE-ADMIT TESTING -  [phone]    [address]  [place] [facility]          Your surgery has been scheduled at [facility]. We are pleased to have the opportunity to serve you. For Further Information please call [phone].    On the day of surgery please report to the Information Desk on the 1st floor.    · CONTACT YOUR PHYSICIAN'S OFFICE THE DAY PRIOR TO YOUR SURGERY TO OBTAIN YOUR ARRIVAL TIME.     · The evening before surgery do not eat anything after 9 p.m. ( this includes hard candy, chewing gum and mints).  You may only have GATORADE, POWERADE AND WATER  from 9 p.m. until you leave your home.   DO NOT DRINK ANY LIQUIDS ON THE WAY TO THE HOSPITAL.      SPECIAL MEDICATION INSTRUCTIONS: TAKE medications checked off by the Anesthesiologist on your Medication List.    Angiogram Patients: Take medications as instructed by your physician, including aspirin.     Surgery Patients:    If you take ASPIRIN - Your PHYSICIAN/SURGEON will need to inform you IF/OR when you need to stop taking aspirin prior to your surgery.     Do Not take any medications containing IBUPROFEN.  Do Not Wear any make-up or dark nail polish   (especially eye make-up) to surgery. If you come to surgery with makeup on you will be required to remove the makeup or nail polish.    Do not shave your surgical area at least 5 days prior to your surgery. The surgical prep will be performed at the hospital according to Infection Control regulations.    Leave all valuables at home.   Do Not wear any jewelry or watches, including any metal in body piercings.  Contact Lens must be removed before surgery. Either do not wear the contact lens or bring a case and solution for storage.  Please bring a container for eyeglasses or dentures as required.  Bring any paperwork your physician has provided, such as consent forms,  history and physicals, doctor's orders, etc.   Bring comfortable clothes that are loose fitting to wear upon  discharge. Take into consideration the type of surgery being performed.  Maintain your diet as advised per your physician the day prior to surgery.      Adequate rest the night before surgery is advised.   Park in the Parking lot behind the hospital or in the [facility] across the street from the parking lot. Parking is complimentary.  If you will be discharged the same day as your procedure, please arrange for a responsible adult to drive you home or to accompany you if traveling by taxi.   YOU WILL NOT BE PERMITTED TO DRIVE OR TO LEAVE THE HOSPITAL ALONE AFTER SURGERY.   It is strongly recommended that you arrange for someone to remain with you for the first 24 hrs following your surgery.       Thank you for your cooperation.  The Staff of [facility].        Bathing Instructions                                                                 Please shower the evening before and morning of your procedure with    ANTIBACTERIAL SOAP. ( DIAL, etc )  Concentrate on the surgical area   for at least 3 minutes and rinse completely. Dry off as usual.   Do not use any deodorant, powder, body lotions, perfume, after shave or    cologne.

## 2018-10-09 NOTE — ANESTHESIA PREPROCEDURE EVALUATION
10/09/2018  Aníbal Anguiano is a 68 y.o., male.    Anesthesia Evaluation    I have reviewed the Patient Summary Reports.    I have reviewed the Nursing Notes.   I have reviewed the Medications.     Review of Systems  Anesthesia Hx:  No problems with previous Anesthesia  History of prior surgery of interest to airway management or planning: Previous anesthesia: General Cardioversion EJ Dr Schwarz Sept 2018 with general anesthesia.  Denies Family Hx of Anesthesia complications.   Denies Personal Hx of Anesthesia complications.   Social:  Non-Smoker    Hematology/Oncology:     Oncology Normal   Hematology Comments: On Eliquis will be off for surg   EENT/Dental:EENT/Dental Normal   Cardiovascular:   Hypertension, well controlled Dysrhythmias atrial fibrillation hyperlipidemia Recent CV to NSR ,cleared by Cardiology   Pulmonary:  Pulmonary Normal    Renal/:  Renal/ Normal     Hepatic/GI:   PUD,    Musculoskeletal:   Arthritis     Neurological:  Neurology Normal    Endocrine:  Endocrine Normal    Dermatological:  Skin Normal    Psych:  Psychiatric Normal           Physical Exam  General:  Well nourished    Airway/Jaw/Neck:  Airway Findings: Mouth Opening: Normal Tongue: Normal  Mallampati: II      Dental:  Dental Findings: Periodontal disease, Mild, In tact        Mental Status:  Mental Status Findings:  Cooperative, Alert and Oriented         Anesthesia Plan  Type of Anesthesia, risks & benefits discussed:  Anesthesia Type:  spinal  Patient's Preference:   Intra-op Monitoring Plan:   Intra-op Monitoring Plan Comments:   Post Op Pain Control Plan: multimodal analgesia  Post Op Pain Control Plan Comments:   Induction:    Beta Blocker:         Informed Consent: Patient understands risks and agrees with Anesthesia plan.  Questions answered. Anesthesia consent signed with patient.  ASA Score: 2     Day of Surgery  Review of History & Physical:    H&P update referred to the surgeon.     Anesthesia Plan Notes: Outside labs and EKG on paper chaet,be sure off Eliquis for SAB        Ready For Surgery From Anesthesia Perspective.

## 2018-10-16 ENCOUNTER — HOSPITAL ENCOUNTER (INPATIENT)
Facility: OTHER | Age: 68
LOS: 1 days | Discharge: HOME HEALTH | DRG: 470 | End: 2018-10-17
Attending: ORTHOPAEDIC SURGERY | Admitting: ORTHOPAEDIC SURGERY
Payer: MEDICARE

## 2018-10-16 ENCOUNTER — ANESTHESIA (OUTPATIENT)
Dept: SURGERY | Facility: OTHER | Age: 68
DRG: 470 | End: 2018-10-16
Payer: MEDICARE

## 2018-10-16 DIAGNOSIS — M17.12 PRIMARY OSTEOARTHRITIS OF LEFT KNEE: Primary | ICD-10-CM

## 2018-10-16 LAB
ANION GAP SERPL CALC-SCNC: 6 MMOL/L
BASOPHILS # BLD AUTO: 0.03 K/UL
BASOPHILS NFR BLD: 0.5 %
BUN SERPL-MCNC: 19 MG/DL
CALCIUM SERPL-MCNC: 9.2 MG/DL
CHLORIDE SERPL-SCNC: 109 MMOL/L
CO2 SERPL-SCNC: 25 MMOL/L
CREAT SERPL-MCNC: 1.2 MG/DL
DIFFERENTIAL METHOD: ABNORMAL
EOSINOPHIL # BLD AUTO: 0.3 K/UL
EOSINOPHIL NFR BLD: 3.8 %
ERYTHROCYTE [DISTWIDTH] IN BLOOD BY AUTOMATED COUNT: 12.9 %
EST. GFR  (AFRICAN AMERICAN): >60 ML/MIN/1.73 M^2
EST. GFR  (NON AFRICAN AMERICAN): >60 ML/MIN/1.73 M^2
GLUCOSE SERPL-MCNC: 97 MG/DL
HCT VFR BLD AUTO: 38.3 %
HGB BLD-MCNC: 12.8 G/DL
LYMPHOCYTES # BLD AUTO: 1.4 K/UL
LYMPHOCYTES NFR BLD: 21.2 %
MCH RBC QN AUTO: 31.6 PG
MCHC RBC AUTO-ENTMCNC: 33.4 G/DL
MCV RBC AUTO: 95 FL
MONOCYTES # BLD AUTO: 0.7 K/UL
MONOCYTES NFR BLD: 9.9 %
NEUTROPHILS # BLD AUTO: 4.3 K/UL
NEUTROPHILS NFR BLD: 64.4 %
PLATELET # BLD AUTO: 136 K/UL
PMV BLD AUTO: 9.9 FL
POTASSIUM SERPL-SCNC: 4.3 MMOL/L
RBC # BLD AUTO: 4.05 M/UL
SODIUM SERPL-SCNC: 140 MMOL/L
WBC # BLD AUTO: 6.64 K/UL

## 2018-10-16 PROCEDURE — 94761 N-INVAS EAR/PLS OXIMETRY MLT: CPT

## 2018-10-16 PROCEDURE — 25000003 PHARM REV CODE 250: Performed by: ANESTHESIOLOGY

## 2018-10-16 PROCEDURE — 37000009 HC ANESTHESIA EA ADD 15 MINS: Performed by: ORTHOPAEDIC SURGERY

## 2018-10-16 PROCEDURE — 71000033 HC RECOVERY, INTIAL HOUR: Performed by: ORTHOPAEDIC SURGERY

## 2018-10-16 PROCEDURE — 85025 COMPLETE CBC W/AUTO DIFF WBC: CPT

## 2018-10-16 PROCEDURE — 36000710: Performed by: ORTHOPAEDIC SURGERY

## 2018-10-16 PROCEDURE — 25000003 PHARM REV CODE 250: Performed by: ORTHOPAEDIC SURGERY

## 2018-10-16 PROCEDURE — 97116 GAIT TRAINING THERAPY: CPT

## 2018-10-16 PROCEDURE — 97161 PT EVAL LOW COMPLEX 20 MIN: CPT

## 2018-10-16 PROCEDURE — C9290 INJ, BUPIVACAINE LIPOSOME: HCPCS | Performed by: ORTHOPAEDIC SURGERY

## 2018-10-16 PROCEDURE — 63600175 PHARM REV CODE 636 W HCPCS: Performed by: ORTHOPAEDIC SURGERY

## 2018-10-16 PROCEDURE — 63600175 PHARM REV CODE 636 W HCPCS: Performed by: ANESTHESIOLOGY

## 2018-10-16 PROCEDURE — 25000003 PHARM REV CODE 250: Performed by: NURSE PRACTITIONER

## 2018-10-16 PROCEDURE — 27201423 OPTIME MED/SURG SUP & DEVICES STERILE SUPPLY: Performed by: ORTHOPAEDIC SURGERY

## 2018-10-16 PROCEDURE — C1713 ANCHOR/SCREW BN/BN,TIS/BN: HCPCS | Performed by: ORTHOPAEDIC SURGERY

## 2018-10-16 PROCEDURE — 80048 BASIC METABOLIC PNL TOTAL CA: CPT

## 2018-10-16 PROCEDURE — C1776 JOINT DEVICE (IMPLANTABLE): HCPCS | Performed by: ORTHOPAEDIC SURGERY

## 2018-10-16 PROCEDURE — 71000039 HC RECOVERY, EACH ADD'L HOUR: Performed by: ORTHOPAEDIC SURGERY

## 2018-10-16 PROCEDURE — 94799 UNLISTED PULMONARY SVC/PX: CPT

## 2018-10-16 PROCEDURE — 63600175 PHARM REV CODE 636 W HCPCS: Performed by: NURSE ANESTHETIST, CERTIFIED REGISTERED

## 2018-10-16 PROCEDURE — 97110 THERAPEUTIC EXERCISES: CPT

## 2018-10-16 PROCEDURE — 36415 COLL VENOUS BLD VENIPUNCTURE: CPT

## 2018-10-16 PROCEDURE — 11000001 HC ACUTE MED/SURG PRIVATE ROOM

## 2018-10-16 PROCEDURE — 25000003 PHARM REV CODE 250: Performed by: NURSE ANESTHETIST, CERTIFIED REGISTERED

## 2018-10-16 PROCEDURE — 0SRD0J9 REPLACEMENT OF LEFT KNEE JOINT WITH SYNTHETIC SUBSTITUTE, CEMENTED, OPEN APPROACH: ICD-10-PCS | Performed by: ORTHOPAEDIC SURGERY

## 2018-10-16 PROCEDURE — 36000711: Performed by: ORTHOPAEDIC SURGERY

## 2018-10-16 PROCEDURE — 37000008 HC ANESTHESIA 1ST 15 MINUTES: Performed by: ORTHOPAEDIC SURGERY

## 2018-10-16 RX ORDER — LIDOCAINE HYDROCHLORIDE 10 MG/ML
0.5 INJECTION, SOLUTION EPIDURAL; INFILTRATION; INTRACAUDAL; PERINEURAL ONCE
Status: DISCONTINUED | OUTPATIENT
Start: 2018-10-16 | End: 2018-10-16 | Stop reason: HOSPADM

## 2018-10-16 RX ORDER — SODIUM CHLORIDE 0.9 % (FLUSH) 0.9 %
3 SYRINGE (ML) INJECTION
Status: DISCONTINUED | OUTPATIENT
Start: 2018-10-16 | End: 2018-10-16

## 2018-10-16 RX ORDER — FENTANYL CITRATE 50 UG/ML
25 INJECTION, SOLUTION INTRAMUSCULAR; INTRAVENOUS EVERY 5 MIN PRN
Status: DISCONTINUED | OUTPATIENT
Start: 2018-10-16 | End: 2018-10-16 | Stop reason: HOSPADM

## 2018-10-16 RX ORDER — PROPOFOL 10 MG/ML
VIAL (ML) INTRAVENOUS CONTINUOUS PRN
Status: DISCONTINUED | OUTPATIENT
Start: 2018-10-16 | End: 2018-10-16

## 2018-10-16 RX ORDER — LISINOPRIL 10 MG/1
10 TABLET ORAL DAILY
Status: DISCONTINUED | OUTPATIENT
Start: 2018-10-16 | End: 2018-10-17 | Stop reason: HOSPADM

## 2018-10-16 RX ORDER — HYDROMORPHONE HYDROCHLORIDE 2 MG/ML
0.4 INJECTION, SOLUTION INTRAMUSCULAR; INTRAVENOUS; SUBCUTANEOUS EVERY 5 MIN PRN
Status: DISCONTINUED | OUTPATIENT
Start: 2018-10-16 | End: 2018-10-16 | Stop reason: HOSPADM

## 2018-10-16 RX ORDER — KETOROLAC TROMETHAMINE 30 MG/ML
INJECTION, SOLUTION INTRAMUSCULAR; INTRAVENOUS
Status: DISCONTINUED | OUTPATIENT
Start: 2018-10-16 | End: 2018-10-16 | Stop reason: HOSPADM

## 2018-10-16 RX ORDER — CEFAZOLIN SODIUM 1 G/3ML
2 INJECTION, POWDER, FOR SOLUTION INTRAMUSCULAR; INTRAVENOUS
Status: COMPLETED | OUTPATIENT
Start: 2018-10-16 | End: 2018-10-16

## 2018-10-16 RX ORDER — SIMVASTATIN 10 MG/1
20 TABLET, FILM COATED ORAL NIGHTLY
Status: DISCONTINUED | OUTPATIENT
Start: 2018-10-16 | End: 2018-10-17 | Stop reason: HOSPADM

## 2018-10-16 RX ORDER — SODIUM CHLORIDE 0.9 % (FLUSH) 0.9 %
5 SYRINGE (ML) INJECTION
Status: DISCONTINUED | OUTPATIENT
Start: 2018-10-16 | End: 2018-10-17 | Stop reason: HOSPADM

## 2018-10-16 RX ORDER — ZOLPIDEM TARTRATE 5 MG/1
5 TABLET ORAL NIGHTLY PRN
Status: DISCONTINUED | OUTPATIENT
Start: 2018-10-16 | End: 2018-10-17 | Stop reason: HOSPADM

## 2018-10-16 RX ORDER — SODIUM CHLORIDE, SODIUM LACTATE, POTASSIUM CHLORIDE, CALCIUM CHLORIDE 600; 310; 30; 20 MG/100ML; MG/100ML; MG/100ML; MG/100ML
INJECTION, SOLUTION INTRAVENOUS CONTINUOUS
Status: DISCONTINUED | OUTPATIENT
Start: 2018-10-16 | End: 2018-10-17 | Stop reason: HOSPADM

## 2018-10-16 RX ORDER — ONDANSETRON 2 MG/ML
8 INJECTION INTRAMUSCULAR; INTRAVENOUS EVERY 8 HOURS PRN
Status: DISCONTINUED | OUTPATIENT
Start: 2018-10-16 | End: 2018-10-17 | Stop reason: HOSPADM

## 2018-10-16 RX ORDER — OXYCODONE HYDROCHLORIDE 5 MG/1
5 TABLET ORAL EVERY 4 HOURS PRN
Status: DISCONTINUED | OUTPATIENT
Start: 2018-10-16 | End: 2018-10-17 | Stop reason: HOSPADM

## 2018-10-16 RX ORDER — MUPIROCIN 20 MG/G
OINTMENT TOPICAL
Status: CANCELLED | OUTPATIENT
Start: 2018-10-16

## 2018-10-16 RX ORDER — FAMOTIDINE 20 MG/1
20 TABLET, FILM COATED ORAL 2 TIMES DAILY
Status: DISCONTINUED | OUTPATIENT
Start: 2018-10-16 | End: 2018-10-17 | Stop reason: HOSPADM

## 2018-10-16 RX ORDER — TRANEXAMIC ACID 100 MG/ML
INJECTION, SOLUTION INTRAVENOUS
Status: DISCONTINUED | OUTPATIENT
Start: 2018-10-16 | End: 2018-10-16 | Stop reason: HOSPADM

## 2018-10-16 RX ORDER — OXYCODONE HYDROCHLORIDE 5 MG/1
10 TABLET ORAL EVERY 4 HOURS PRN
Status: DISCONTINUED | OUTPATIENT
Start: 2018-10-16 | End: 2018-10-17 | Stop reason: HOSPADM

## 2018-10-16 RX ORDER — MUPIROCIN 20 MG/G
1 OINTMENT TOPICAL 2 TIMES DAILY
Status: DISCONTINUED | OUTPATIENT
Start: 2018-10-16 | End: 2018-10-17 | Stop reason: HOSPADM

## 2018-10-16 RX ORDER — BACITRACIN 50000 [IU]/1
INJECTION, POWDER, FOR SOLUTION INTRAMUSCULAR
Status: DISCONTINUED | OUTPATIENT
Start: 2018-10-16 | End: 2018-10-16 | Stop reason: HOSPADM

## 2018-10-16 RX ORDER — MEPERIDINE HYDROCHLORIDE 50 MG/ML
12.5 INJECTION INTRAMUSCULAR; INTRAVENOUS; SUBCUTANEOUS ONCE AS NEEDED
Status: DISCONTINUED | OUTPATIENT
Start: 2018-10-16 | End: 2018-10-16 | Stop reason: HOSPADM

## 2018-10-16 RX ORDER — MIDAZOLAM HYDROCHLORIDE 1 MG/ML
2 INJECTION INTRAMUSCULAR; INTRAVENOUS
Status: COMPLETED | OUTPATIENT
Start: 2018-10-16 | End: 2018-10-16

## 2018-10-16 RX ORDER — GLYCOPYRROLATE 0.2 MG/ML
INJECTION INTRAMUSCULAR; INTRAVENOUS
Status: DISCONTINUED | OUTPATIENT
Start: 2018-10-16 | End: 2018-10-16

## 2018-10-16 RX ORDER — CEFAZOLIN SODIUM 1 G/50ML
2 SOLUTION INTRAVENOUS
Status: COMPLETED | OUTPATIENT
Start: 2018-10-16 | End: 2018-10-17

## 2018-10-16 RX ORDER — SOTALOL HYDROCHLORIDE 80 MG/1
40 TABLET ORAL 2 TIMES DAILY
Status: DISCONTINUED | OUTPATIENT
Start: 2018-10-16 | End: 2018-10-17 | Stop reason: HOSPADM

## 2018-10-16 RX ORDER — ENOXAPARIN SODIUM 100 MG/ML
40 INJECTION SUBCUTANEOUS EVERY 24 HOURS
Status: DISCONTINUED | OUTPATIENT
Start: 2018-10-17 | End: 2018-10-17 | Stop reason: HOSPADM

## 2018-10-16 RX ORDER — BUPIVACAINE HYDROCHLORIDE AND EPINEPHRINE 5; 5 MG/ML; UG/ML
INJECTION, SOLUTION EPIDURAL; INTRACAUDAL; PERINEURAL
Status: DISCONTINUED | OUTPATIENT
Start: 2018-10-16 | End: 2018-10-16 | Stop reason: HOSPADM

## 2018-10-16 RX ORDER — LEVOTHYROXINE SODIUM 75 UG/1
75 TABLET ORAL
Status: DISCONTINUED | OUTPATIENT
Start: 2018-10-16 | End: 2018-10-17 | Stop reason: HOSPADM

## 2018-10-16 RX ORDER — DOCUSATE SODIUM 100 MG/1
100 CAPSULE, LIQUID FILLED ORAL EVERY 12 HOURS
Status: DISCONTINUED | OUTPATIENT
Start: 2018-10-16 | End: 2018-10-17 | Stop reason: HOSPADM

## 2018-10-16 RX ORDER — ROPIVACAINE HYDROCHLORIDE 5 MG/ML
INJECTION, SOLUTION EPIDURAL; INFILTRATION; PERINEURAL
Status: COMPLETED | OUTPATIENT
Start: 2018-10-16 | End: 2018-10-16

## 2018-10-16 RX ORDER — OXYCODONE HYDROCHLORIDE 5 MG/1
5 TABLET ORAL
Status: DISCONTINUED | OUTPATIENT
Start: 2018-10-16 | End: 2018-10-16 | Stop reason: HOSPADM

## 2018-10-16 RX ORDER — ONDANSETRON 2 MG/ML
4 INJECTION INTRAMUSCULAR; INTRAVENOUS DAILY PRN
Status: DISCONTINUED | OUTPATIENT
Start: 2018-10-16 | End: 2018-10-16 | Stop reason: HOSPADM

## 2018-10-16 RX ORDER — LIDOCAINE HCL/PF 100 MG/5ML
SYRINGE (ML) INTRAVENOUS
Status: DISCONTINUED | OUTPATIENT
Start: 2018-10-16 | End: 2018-10-16

## 2018-10-16 RX ORDER — SODIUM CHLORIDE, SODIUM LACTATE, POTASSIUM CHLORIDE, CALCIUM CHLORIDE 600; 310; 30; 20 MG/100ML; MG/100ML; MG/100ML; MG/100ML
INJECTION, SOLUTION INTRAVENOUS CONTINUOUS
Status: DISCONTINUED | OUTPATIENT
Start: 2018-10-16 | End: 2018-10-16

## 2018-10-16 RX ADMIN — OXYCODONE HYDROCHLORIDE 5 MG: 5 TABLET ORAL at 01:10

## 2018-10-16 RX ADMIN — PROPOFOL 50 MCG/KG/MIN: 10 INJECTION, EMULSION INTRAVENOUS at 11:10

## 2018-10-16 RX ADMIN — GLYCOPYRROLATE 0.2 MG: 0.2 INJECTION, SOLUTION INTRAMUSCULAR; INTRAVENOUS at 11:10

## 2018-10-16 RX ADMIN — MUPIROCIN 1 G: 20 OINTMENT TOPICAL at 09:10

## 2018-10-16 RX ADMIN — CEFAZOLIN SODIUM 2 G: 1 SOLUTION INTRAVENOUS at 09:10

## 2018-10-16 RX ADMIN — FAMOTIDINE 20 MG: 20 TABLET ORAL at 08:10

## 2018-10-16 RX ADMIN — CEFAZOLIN 2 G: 330 INJECTION, POWDER, FOR SOLUTION INTRAMUSCULAR; INTRAVENOUS at 11:10

## 2018-10-16 RX ADMIN — DOCUSATE SODIUM 100 MG: 100 CAPSULE, LIQUID FILLED ORAL at 08:10

## 2018-10-16 RX ADMIN — LIDOCAINE HYDROCHLORIDE 100 MG: 20 INJECTION, SOLUTION INTRAVENOUS at 11:10

## 2018-10-16 RX ADMIN — LEVOTHYROXINE SODIUM 75 MCG: 75 TABLET ORAL at 03:10

## 2018-10-16 RX ADMIN — ROPIVACAINE HYDROCHLORIDE 3 ML: 5 INJECTION, SOLUTION EPIDURAL; INFILTRATION; PERINEURAL at 10:10

## 2018-10-16 RX ADMIN — OXYCODONE HYDROCHLORIDE 10 MG: 5 TABLET ORAL at 08:10

## 2018-10-16 RX ADMIN — MIDAZOLAM HYDROCHLORIDE 2 MG: 1 INJECTION, SOLUTION INTRAMUSCULAR; INTRAVENOUS at 10:10

## 2018-10-16 RX ADMIN — SODIUM CHLORIDE, SODIUM LACTATE, POTASSIUM CHLORIDE, AND CALCIUM CHLORIDE: .6; .31; .03; .02 INJECTION, SOLUTION INTRAVENOUS at 03:10

## 2018-10-16 RX ADMIN — LISINOPRIL 10 MG: 10 TABLET ORAL at 03:10

## 2018-10-16 RX ADMIN — SIMVASTATIN 20 MG: 10 TABLET, FILM COATED ORAL at 08:10

## 2018-10-16 RX ADMIN — SODIUM CHLORIDE, SODIUM LACTATE, POTASSIUM CHLORIDE, AND CALCIUM CHLORIDE: 600; 310; 30; 20 INJECTION, SOLUTION INTRAVENOUS at 10:10

## 2018-10-16 RX ADMIN — SODIUM CHLORIDE, SODIUM LACTATE, POTASSIUM CHLORIDE, AND CALCIUM CHLORIDE: 600; 310; 30; 20 INJECTION, SOLUTION INTRAVENOUS at 11:10

## 2018-10-16 NOTE — PLAN OF CARE
DME - delivered 10/16/18 Room 364 [id number]  One - Rolling-Walker  One - BS-Commode   Tia Campbell, JESSY  Case Management  [phone]

## 2018-10-16 NOTE — OP NOTE
DATE OF PROCEDURE:  10/16/2018.    SURGEON:  Angel Gutierrez M.D.    PREOPERATIVE DIAGNOSIS:  Left knee tricompartmental osteoarthritis.    POSTOPERATIVE DIAGNOSIS:  Left knee tricompartmental osteoarthritis.    PROCEDURES:  Left total knee arthroplasty with LIMA components.    ANESTHESIA:  Spinal.    COMPLICATIONS:  None.    BLOOD LOSS:  None.    DRAINS:  None.    INDICATIONS:  The patient is a 68-year-old male with a long history of left knee   pain.  He had previous meniscectomy in the 1970s and developed medial   compartment osteoarthritis with tricompartmental changes.  He was unable to walk   more than a half block without severe pain, admitted for total knee   arthroplasty.    FINDINGS:  Left knee showed varus alignment with an old scar over the medial   joint.  Surgical findings showed significant tricompartmental osteoarthritis   with bone-on-bone in the weightbearing compartments and patellofemoral joint   region.  After placement of the prosthetic components, used a cemented 8 femoral   component along with a cemented 7 tibial baseplate.  We used a 10 mm   polyethylene insert, we used a 35 all-poly patella, which was cemented into   place.  After adequate set of time for the cement, the knee had full range of   motion, excellent alignment and stability in both AP and lateral planes,   excellent patellar tracking, no other abnormalities noted.    PROCEDURE IN DETAIL:  After operative consents were obtained, the patient was   brought to the Operating Room, laid in a supine position.  After spinal   anesthesia was administered via Anesthesia Department, the patient was placed in   a well-padded operating table.  All bony prominences were well padded.  A post   was placed on the operating table to allow left knee flexion during the   procedure.  A tourniquet was placed high on the left thigh and the patient did   receive preoperative antibiotics prior to tourniquet elevation.  The left knee   and leg were  then prepped and draped in the usual sterile fashion.  After   exsanguination of the limb, the tourniquet was inflated to 350 mmHg.  Standard   mini approach midline incision was made over the left knee, dissection carried   down to the extensor mechanism.  A medial parapatellar arthrotomy was then made.    The retropatellar tendon fat pad was excised as well as the medial and lateral   meniscal cartilages.  Gaining access into the intramedullary canal of the   femur, the distal femoral alignment jig was applied with a 3 degree valgus cut   reference.  The distal femoral cut was then made.  Referencing off the distal   femur, the sizing jig was applied to accept an 8 cutting block.  The 8 cutting   block was applied and the anterior posterior femoral cuts were made followed by   the anterior posterior chamfer cuts.  Proximal tibia was then exposed and using   the extramedullary tibial alignment guide in the appropriate varus, valgus and   posterior slope., proximal tibia was resected salvaging the PCL.  The sizing jig   was applied to the tibia to accept a 7 tray and a cruciform stem was cut in the   appropriate rotation.  A trial reduction was then carried out with 8 femur, 7   tibia and a 10 mm spacer, the 10 was most stable.  There was excellent alignment   and stability in both AP and lateral planes.  The patella was then resurfaced   removing 9 mm of bone and cartilage and a 35 3-prong patella was drilled   followed by the trial recreating the thickness of the patella.  The patella   tracked well.  There was no need for a lateral release.  All trial components   were then removed and the bone edges were irrigated with pulse jet lavage,   sterile saline with antibiotic solution.  The Legions tissue ablation   instrument was also used for tissue hemostasis at that time.  The permanent   components were opened on the back table.  Two bags of Palacos cement were mixed   and applied to the tibia and the 7 tibial  component was cemented into place,   followed by the cemented 8 femoral component.  The 10 mm polyethylene insert was   placed along with a locking mechanism and then the knee brought out to full   extension further compressing the cement.  The 35 all-poly patella was cemented   into place, held with a patellar clamp.  After adequate set of time for the   cement, the knee had full range of motion, excellent alignment and stability in   both AP and lateral planes, excellent patellar tracking.  The knee was then   irrigated with pulse jet lavage sterile saline with antibiotic solution.    Exparel cocktail was then injected into the capsule and soft tissue   subcutaneously for local anesthesia and then 3 vials of tranexamic acid were   drizzled into the capsule and soft tissue for tissue hemostasis.  The medial   arthrotomy was then closed at the VMO with interrupted 0 Ethibond suture x4 and   then the rest of the arthrotomy was closed with a running #2 Quill suture, subQ   was closed in layers with 0 and 2-0 Vicryl suture and the skin closed with metal   staples.  Sterile dressings were applied consisting of Xeroform gauze, 4 x 4s   and cast padding along with a brake Polar Care unit and an Ace bandage from toe   to groin.  Tourniquet deflated after a little over an hour tourniquet time.  The   patient tolerated the procedure well, was sent to Recovery Room in stable   condition.  Needle and sponge counts were correct.  Blood loss was minimal.  No   blood given.  No drains placed.      TPF/IN  dd: 10/16/2018 13:00:15 (CDT)  td: 10/16/2018 14:02:20 (CD)  Doc ID   #[id number]  Job ID #[id number]    CC:

## 2018-10-16 NOTE — BRIEF OP NOTE
[facility]  Brief Operative Note    SUMMARY     Surgery Date: 10/16/2018     Surgeon(s) and Role:     * Angel Gutierrez MD - Primary    Assisting Surgeon: None    Pre-op Diagnosis:  Primary osteoarthritis of left knee [M17.12]    Post-op Diagnosis:  Post-Op Diagnosis Codes:     * Primary osteoarthritis of left knee [M17.12]    Procedure(s) (LRB):  ARTHROPLASTY, KNEE (Left)    Anesthesia:  Spinal  Description of Procedure:  Left total knee arthroplasty  see operative note number [id number]    Description of the findings of the procedure:  Left knee osteoarthritis status post left total knee arthroplasty    Estimated Blood Loss: * No values recorded between 10/16/2018 11:15 AM and 10/16/2018 12:43 PM *         Specimens:   Specimen (12h ago, onward)    None

## 2018-10-16 NOTE — ANESTHESIA PROCEDURE NOTES
Spinal    Diagnosis: Left total knee  Patient location during procedure: holding area  Timeout: 10/16/2018 10:35 AM  End time: 10/16/2018 10:46 AM  Staffing  Anesthesiologist: Eloy Fernandez MD  Performed: anesthesiologist   Preanesthetic Checklist  Completed: patient identified, site marked, surgical consent, pre-op evaluation, timeout performed, IV checked, risks and benefits discussed and monitors and equipment checked  Spinal Block  Patient position: sitting  Prep: ChloraPrep  Patient monitoring: heart rate, cardiac monitor and continuous pulse ox  Approach: right paramedian  Location: L3-4  Injection technique: single shot  CSF Fluid: clear free-flowing CSF  Needle  Needle type: Quincke   Needle gauge: 22 G  Needle length: 4 in  Additional Documentation: incremental injection and no paresthesia on injection  Needle localization: anatomical landmarks  Assessment  Patient's tolerance of the procedure: comfortable throughout block and no complaints

## 2018-10-16 NOTE — NURSING
No significant events this shift. Remains free from fall, injury, and skin breakdown. Ambulated in hallways with PT; tolerated well.  VSS stable on RA and afebrile. Positions self independently. No c/o pain at this time. Neuro checks WDL. TEDs/SCDs maintained.Tolerating ordered diet. IV site WNL. Plan of care reviewed with patient and all questions answered. Bed low, locked w/ bed alarm on. Call light within reach. Purposeful rounding performed. No other complaints at this time.

## 2018-10-16 NOTE — PLAN OF CARE
SW met with pt at bedside to complete discharge assessment, verified PCP and uses [facility] on [address].  Pt's spouse will provide transportation home and needs a RW, BSC and declined TTB.  GERARD gave list of  agencies and pt would like to be placed with Dr. Gutierrez's preferred provider and signed choice form.     10/16/18 0786   Discharge Assessment   Assessment Type Discharge Planning Assessment   Confirmed/corrected address and phone number on facesheet? Yes   Assessment information obtained from? Patient   Communicated expected length of stay with patient/caregiver no   Prior to hospitilization cognitive status: Alert/Oriented   Prior to hospitalization functional status: Independent   Current cognitive status: Alert/Oriented   Current Functional Status: Assistive Equipment;Needs Assistance   Lives With spouse   Able to Return to Prior Arrangements yes   Is patient able to care for self after discharge? Unable to determine at this time (comments)   Patient's perception of discharge disposition home or selfcare   Readmission Within The Last 30 Days no previous admission in last 30 days   Patient currently being followed by outpatient case management? No   Patient currently receives any other outside agency services? No   Equipment Currently Used at Home none   Do you have any problems affording any of your prescribed medications? No   Is the patient taking medications as prescribed? yes   Does the patient have transportation home? Yes   Transportation Available family or friend will provide   Does the patient receive services at the [facility]? No   Discharge Plan A Home   Patient/Family In Agreement With Plan yes

## 2018-10-16 NOTE — DISCHARGE INSTRUCTIONS
Knee Athroscopy Discharge Instructions    1) Pain: After surgery your knee will be sore. The knee will likely have been injected with a numbing medicine (Exparel) prior to completion of surgery for pain control. This is indicated on a green bracelet that you will continue to wear for 4 days after surgery. You will   also get a prescription for pain control before you leave the hospital. Ice and elevation will assist with pain control.    a) Apply ice as much as possible for the first 72 hours. After 72 hours, apply ice for 20minutes 3-4 times a day, after therapy,after exercising or whenever experiencing pain. Avoid direct skin contact with ice to prevent frostbit.          b) Elevate the affected leg with the pillow the length of the leg  to assist with swelling and pain.  2) Incision Care:  a) Some drainage from the incision in the first 72 hours is normal. If drainage is excessive,remove bandage,  pat dry, cover with sterile gauze and secure with tape. Notify physician about excessive drainage. Staples will be removed 14 days after surgery   3) Activity:  a) Perform exercises 2-3 x day.  b) You may shower 48 hours after surgery providing the dressing is waterproof. No tub or hot tub usage.. Support help is mandatory during showering. If the dressing becomes wet, replace with a new dressing.   c) Wear thigh high lilo hose stockings for 3 weeks after surgery .You may remove stockings for 1- 2 hours during the day only. Send patient home with an extra pair lilo hose.If your physician orders the CPM machine you are to use it for 2 hours in the am and 2 hours in the pm.Increase the flexion 5 degrees each session if tolerated. This is not to replace your exercise program.  4) For lifetime after your replacement surgery, you may need antibiotic coverage before dental or minor surgical procedures.  5) Possible Complications: Call Surgeon  a) Infection: Report these signs and symptoms to your surgeon.  i) Unexpected redness  around incision   ii) Persistent drainage from wound after 72 hours.  iii) Temperature ,can be treated with Tylenol. Do not go to the emergency room or urgent care center, call your surgeon.   iv) Additional swelling  v) Pain not controlled with current pain medication  b) Blood Clot: Report theses signs and symptoms to your surgeon  i) Unusual pain  ii) Red or discolored skin  iii) Swelling in the leg  iv) Unusual warm skin

## 2018-10-16 NOTE — CONSULTS
Consult Note                IM  Consult Requested By: Angel Gutierrez MD  Reason for Consult: osteoarthritis, a-fib, HTN, hyperlipidemia and hypothyroidism    SUBJECTIVE:     History of Present Illness:   68 y.o. male presents with a scheduled left knee repair. Epic and paper chart reviewed prior to eval.  Patient resting in bed, wife at bedside. Denies CP,SOB,F,C,N or V.  Is followed by Dr. Schwarz for cardiology.  Had a cardioversion earlier this year at [facility].     Past Medical History:   Diagnosis Date    Anticoagulant long-term use     eliquis    Atrial fibrillation     Bleeding ulcer     Encounter for blood transfusion     Hypertension     Thyroid disease     hypothyroid     Past Surgical History:   Procedure Laterality Date    ARTHROSCOPY OF SHOULDER WITH DECOMPRESSION OF SUBACROMIAL SPACE Right 6/12/2018    Procedure: ARTHROSCOPY-SHOULDER WITH SUBACROMIAL DECOMPRESSION;  Surgeon: Melvin Camargo MD;  Location: [facility];  Service: Orthopedics;  Laterality: Right;    ARTHROSCOPY-SHOULDER Right 6/12/2018    Performed [facility] at [facility] OR    ARTHROSCOPY-SHOULDER WITH SUBACROMIAL DECOMPRESSION Right 6/12/2018    [facility] by [facility] at [facility] OR    CLAVICULECTOMY, DISTAL-DCE Right 6/12/2018    [facility] by [facility] at [facility] OR    DISTAL CLAVICLE EXCISION Right 6/12/2018    Procedure: CLAVICULECTOMY, DISTAL-DCE;  Surgeon: Melvin Camargo MD;  Location: [facility];  Service: Orthopedics;  Laterality: Right;    KNEE SURGERY Left 1971    REPAIR-ROTATOR CUFF Right 6/12/2018    [facility] at [facility] OR    ROTATOR CUFF REPAIR Right 6/12/2018    Procedure: REPAIR-ROTATOR CUFF;  Surgeon: Melvin Camargo MD;  Location: [facility] OR;  Service: Orthopedics;  Laterality: Right;    SHOULDER ARTHROSCOPY Right 6/12/2018    Procedure: ARTHROSCOPY-SHOULDER;  Surgeon: Melvin Camargo MD;  Location: [facility];  Service: Orthopedics;  Laterality: Right;  BLOCK    TRIGGER  FINGER RELEASE       History reviewed. No pertinent family history.  Social History     Tobacco Use    Smoking status: Never Smoker    Smokeless tobacco: Never Used   Substance Use Topics    Alcohol use: Yes     Comment: social    Drug use: Not on file       Review of patient's allergies indicates:  No Known Allergies     Review of Systems:  Constitutional: No fever or chills  Respiratory: No cough or shortness of breath  Cardiovascular: No chest pain or palpitations  Gastrointestinal: No nausea or vomiting  Neurological: No confusion or weakness    OBJECTIVE:     Vital Signs (Most Recent)  Temp: 96 °F (35.6 °C) (10/16/18 1429)  Pulse: (!) 45 (10/16/18 1429)  Resp: 18 (10/16/18 1429)  BP: (!) 179/87 (10/16/18 1429)  SpO2: 95 % (10/16/18 1429)    Vital Signs Range (Last 24H):  Temp:  [96 °F (35.6 °C)-97.8 °F (36.6 °C)]   Pulse:  [45-57]   Resp:  [16-18]   BP: (154-180)/(73-87)   SpO2:  [95 %-100 %]       Intake/Output Summary (Last 24 hours) at 10/16/2018 1442  Last data filed at 10/16/2018 1408  Gross per 24 hour   Intake 1550 ml   Output 950 ml   Net 600 ml       Physical Exam:  General appearance: Well developed, well nourished  Eyes:  Conjunctivae/corneas clear. PERRL.  Lungs: Normal respiratory effort,   clear to auscultation bilaterally   Heart: Regular rate and rhythm, S1, S2 normal, no murmur, rub or andrew.  Abdomen: Soft, non-tender non-distended; bowel sounds normal; no masses,  no organomegaly  Extremities: No cyanosis or clubbing. No edema.  +2 pulses BLE  Skin: Skin color, texture, turgor normal. No rashes or lesions ACE wrap left knee CDI  Neurologic: Normal strength and tone. No focal numbness or weakness   Jorgensen      Laboratory:    Reviewed    Diagnostic Results:      ASSESSMENT/PLAN:     1. Left knee repair (M17.12): per therapy and ortho teams  2. HTN (I10): home meds with hold parameters  3. A-fib (I48.91): continue sotolol, monitor on tele. Per Dr. Schwarz  4. Hypothyroidism (E07.9): continue  home med  5. Hyperlipidemia (E78.5): continue home med  6. DVT prophy: lovenox, LITZY and SCD per ortho    Plan: Thanks for consult, See above recommendations and orders. Will follow along.

## 2018-10-16 NOTE — ANESTHESIA POSTPROCEDURE EVALUATION
Anesthesia Post Evaluation    Patient: Aníbal Anguiano    Procedure(s) Performed: Procedure(s) (LRB):  ARTHROPLASTY, KNEE (Left)    Final Anesthesia Type: spinal  Patient location during evaluation: PACU  Patient participation: Yes- Able to Participate  Level of consciousness: awake and alert  Post-procedure vital signs: reviewed and stable  Pain management: adequate  Airway patency: patent  PONV status at discharge: No PONV  Anesthetic complications: no      Cardiovascular status: blood pressure returned to baseline  Respiratory status: unassisted and room air  Hydration status: euvolemic  Follow-up not needed.        Visit Vitals  BP (!) 179/87   Pulse (!) 45   Temp 35.6 °C (96 °F)   Resp 18   Ht 6' (1.829 m)   Wt 99.8 kg (219 lb 16 oz)   SpO2 95%   BMI 29.84 kg/m²       Pain/Amilcar Score: Pain Assessment Performed: Yes (10/16/2018  1:15 PM)  Presence of Pain: complains of pain/discomfort (10/16/2018  1:06 PM)  Pain Rating Prior to Med Admin: 4 (10/16/2018  1:06 PM)  Pain Rating Post Med Admin: 2 (10/16/2018  2:08 PM)  Amilcar Score: 10 (10/16/2018  2:08 PM)

## 2018-10-16 NOTE — PT/OT/SLP EVAL
"Physical Therapy Evaluation and treatment    Patient Name:  Aníbal Anguiano   MRN:  [id number]    Recommendations:     Discharge Recommendations:  home with home health, home health OT, home health PT   Discharge Equipment Recommendations: 3-in-1 commode, walker, rolling   Barriers to discharge: None    Assessment:     Aníbal Anguiano is a 68 y.o. male admitted with a medical diagnosis of Primary osteoarthritis of left knee.  He presents with the following impairments/functional limitations:  weakness, impaired endurance, impaired balance, impaired functional mobilty, decreased ROM, decreased lower extremity function, gait instability .  Good initial session.  Anticipate dc tomorrow    Rehab Prognosis:  excellent; patient would benefit from acute skilled PT services to address these deficits and reach maximum level of function.      Recent Surgery: Procedure(s) (LRB):  ARTHROPLASTY, KNEE (Left) Day of Surgery    Plan:     During this hospitalization, patient to be seen BID to address the above listed problems via gait training, therapeutic activities, therapeutic exercises  · Plan of Care Expires:  11/15/18   Plan of Care Reviewed with: spouse    Subjective     Communicated with nurse prior to session.  Patient found sitting up in bed  upon PT entry to room, agreeable to evaluation.      Chief Complaint: none stated   Patient comments/goals: ready to get up  Pain/Comfort:  · Pain Rating 1: 0/10  · Location - Side 1: Left  · Location - Orientation 1: generalized  · Location 1: knee  · Pain Addressed 1: Pre-medicate for activity, Reposition, Distraction, Cessation of Activity, Nurse notified  · Pain Rating Post-Intervention 1: 1/10    Patients cultural, spiritual, Baptist conflicts given the current situation: none stated    Living Environment:  Lives with wife in 1 story with 6" ALYSIA.  Has tub shower combo  with bars and 1 handicap toilet and 1 low toilet  Prior to admission, patients level of function was I PTA for " amb and ADL, no AD, drives, works doing manual labor on trucks, [employer].  Patient uses the following equipment: none.  DME owned (not currently used): none.  Upon discharge, patient will have assistance from wife-she is retired.    Objective:     Patient found with: cryotherapy, FCD, cline catheter, peripheral IV     General Precautions: Standard, fall   Orthopedic Precautions:LLE weight bearing as tolerated   Braces: N/A      Exams:  · Cognitive Exam:  Patient is oriented to Person, Place, Time and Situation  · Gross Motor Coordination:  impaired on L  · Postural Exam:  Patient presented with the following abnormalities:    · -       Rounded shoulders  · -       Forward head  · Sensation: intact to light touch in BLE   · Skin Integrity/Edema:      · -       Skin integrity: LLE in surgical bandage  · -       Edema: L knee  · RLE ROM: WFL  · RLE Strength: WFL  · LLE ROM: Deficits: decreased in knee, ankle WFL  · LLE Strength: Deficits: fair quad contraction, able to  SLR, ankle WFL        Functional Mobility:  · Bed Mobility:     · Supine to Sit: contact guard assistance  · Transfers:     · Sit to Stand:  contact guard assistance with rolling walker  · Gait: pt amb 180' with RW and CGA.  instruct in heel toe reciprocal gait .  decreased tiff and stride length  · Balance: fair standing    AM-PAC 6 CLICK MOBILITY  Total Score:18       Therapeutic Activities and Exercises:   PT eval.   Pt performed 1 set of 10 reps of LLE  1. Glut sets  2. Quad sets  3. Ankle pumps  4. Hip abd/add  5. SLR  6. Knee flexion (heel slides)  7. Short arc quads  In sitting   Long arc quads  Hip flex  Pt required verbal cues, tactile cues and visual cues for technique in order to complete.      Gait as above        Patient left up in chair with all lines intact, call button in reach, nurse notified and wife present.    GOALS:   Multidisciplinary Problems     Physical Therapy Goals        Problem: Physical Therapy Goal    Goal Priority  "Disciplines Outcome Goal Variances Interventions   Physical Therapy Goal     PT, PT/OT Ongoing (interventions implemented as appropriate)     Description:  Goals to be met by: 10/30/18     Patient will increase functional independence with mobility by performin. Supine to sit with supervision.   2. Sit to supine with supervision.   3. Sit<>stand transfer with supervision using rolling walker.   4. Gait > 150 feet with SBA using rolling walker.   5. Ascend/descend 6" curb step  with CGA with RW                    History:     Past Medical History:   Diagnosis Date    Anticoagulant long-term use     eliquis    Atrial fibrillation     Bleeding ulcer     Encounter for blood transfusion     Hypertension     Thyroid disease     hypothyroid       Past Surgical History:   Procedure Laterality Date    ARTHROSCOPY OF SHOULDER WITH DECOMPRESSION OF SUBACROMIAL SPACE Right 2018    Procedure: ARTHROSCOPY-SHOULDER WITH SUBACROMIAL DECOMPRESSION;  Surgeon: Melvin Camargo MD;  Location: [facility];  Service: Orthopedics;  Laterality: Right;    ARTHROSCOPY-SHOULDER Right 2018    [facility] OR    ARTHROSCOPY-SHOULDER WITH SUBACROMIAL DECOMPRESSION Right 2018    [facility] at [facility]    CLAVICULECTOMY, DISTAL-DCE Right 2018    [facility] OR    DISTAL CLAVICLE EXCISION Right 2018    Procedure: CLAVICULECTOMY, DISTAL-DCE;  Surgeon: Melvin Camargo MD;  Location: [facility];  Service: Orthopedics;  Laterality: Right;    KNEE SURGERY Left 1971    REPAIR-ROTATOR CUFF Right 2018    [facility] OR    ROTATOR CUFF REPAIR Right 2018    Procedure: REPAIR-ROTATOR CUFF;  Surgeon: Melvin Camargo MD;  Location: [facility];  Service: Orthopedics;  Laterality: Right;    SHOULDER ARTHROSCOPY Right 2018    Procedure: ARTHROSCOPY-SHOULDER;  Surgeon: Melvin Camargo MD;  [facility]" OR;  Service: Orthopedics;  Laterality: Right;  BLOCK    TRIGGER FINGER RELEASE         Clinical Decision Making:     History  Co-morbidities and personal factors that may impact the plan of care Examination  Body Structures and Functions, activity limitations and participation restrictions that may impact the plan of care Clinical Presentation   Decision Making/ Complexity Score   Co-morbidities:   [] Time since onset of injury / illness / exacerbation  [] Status of current condition  []Patient's cognitive status and safety concerns    [] Multiple Medical Problems (see med hx)  Personal Factors:   [] Patient's age  [] Prior Level of function   [] Patient's home situation (environment and family support)  [] Patient's level of motivation  [] Expected progression of patient      HISTORY:(criteria)    [] 40660 - no personal factors/history    [] 17093 - has 1-2 personal factor/comorbidity     [] 46079 - has >3 personal factor/comorbidity     Body Regions:  [] Objective examination findings  [] Head     []  Neck  [] Trunk   [] Upper Extremity  [] Lower Extremity    Body Systems:  [] For communication ability, affect, cognition, language, and learning style: the assessment of the ability to make needs known, consciousness, orientation (person, place, and time), expected emotional /behavioral responses, and learning preferences (eg, learning barriers, education  needs)  [] For the neuromuscular system: a general assessment of gross coordinated movement (eg, balance, gait, locomotion, transfers, and transitions) and motor function  (motor control and motor learning)  [] For the musculoskeletal system: the assessment of gross symmetry, gross range of motion, gross strength, height, and weight  [] For the integumentary system: the assessment of pliability(texture), presence of scar formation, skin color, and skin integrity  [] For cardiovascular/pulmonary system: the assessment of heart rate, respiratory rate, blood  pressure, and edema     Activity limitations:    [] Patient's cognitive status and saf ety concerns          [] Status of current condition      [] Weight bearing restriction  [] Cardiopulmunary Restriction    Participation Restrictions:   [] Goals and goal agreement with the patient     [] Rehab potential (prognosis) and probable outcome      Examination of Body System: (criteria)    [] 48808 - addressing 1-2 elements    [] 42516 - addressing a total of 3 or more elements     [] 53240 -  Addressing a total of 4 or more elements         Clinical Presentation: (criteria)  Choose one     On examination of body system using standardized tests and measures patient presents with (CHOOSE ONE) elements from any of the following: body structures and functions, activity limitations, and/or participation restrictions.  Leading to a clinical presentation that is considered (CHOOSE ONE)                              Clinical Decision Making  (Eval Complexity):  Choose One     Time Tracking:     PT Received On: 10/16/18  PT Start Time: 1544     PT Stop Time: 1630  PT Total Time (min): 46 min     Billable Minutes: Evaluation 15, Gait Training 14 and Therapeutic Exercise 17      Kate Landeros, PT  10/16/2018

## 2018-10-16 NOTE — TRANSFER OF CARE
Anesthesia Transfer of Care Note    Patient: Aníbal Anguiano    Procedure(s) Performed: Procedure(s) (LRB):  ARTHROPLASTY, KNEE (Left)    Patient location: PACU    Anesthesia Type: spinal    Transport from OR: Transported from OR on room air with adequate spontaneous ventilation    Post pain: adequate analgesia    Post assessment: no apparent anesthetic complications    Post vital signs: stable    Level of consciousness: awake    Nausea/Vomiting: no nausea/vomiting    Complications: none    Transfer of care protocol was followed      Last vitals:   Visit Vitals  BP (!) 170/82 (BP Location: Right arm, Patient Position: Sitting)   Pulse (!) 53   Temp 36.6 °C (97.8 °F) (Oral)   Resp 16   Ht 6' (1.829 m)   Wt 99.8 kg (219 lb 16 oz)   SpO2 96%   BMI 29.84 kg/m²

## 2018-10-16 NOTE — NURSING
Pt arrived to floor via bed with DELANEY Cook. IVF started, SCDs applied, oriented to room, call light placed within reach, bed low and locked, and wife at bedside. Pt denies pain at this time. Jorgensen noted draining clear yellow urine to gravity. Incisions noted to left knee,CDI. No acute distress noted at this time. Will continue to monitor.

## 2018-10-16 NOTE — PLAN OF CARE
Problem: Patient Care Overview  Goal: Plan of Care Review  Outcome: Ongoing (interventions implemented as appropriate)  Pt received on room air with adequate saturation. IS instructed, achieved 3000ml

## 2018-10-16 NOTE — PLAN OF CARE
Patient accepted by [facility]. Melinda from [facility] gave approval to pull rolling walker & bedside commode from our supply - [employer] delivered to bedside      10/16/18 9261   Final Note   Assessment Type Final Discharge Note   Discharge Disposition Home-Health   What phone number can be called within the next 1-3 days to see how you are doing after discharge? [phone]   Discharge plans and expectations educations in teach back method with documentation complete? Yes   Right Care Referral Info   Post Acute Recommendation Home-care   Facility Name [facility]

## 2018-10-16 NOTE — PT/OT/SLP PROGRESS
Occupational Therapy  Not Seen    Aníbal Anguiano   MRN: [id number]     Patient not seen for Occupational Therapy today due to ( ) departmental protocol for elective surgery patients.    Patient with Primary osteoarthritis of left knee [M17.12], s/p Procedure(s):  ARTHROPLASTY, KNEE 10/16/2018 who will be seen for Occupational Therapy evaluation POD#1.    Benny Galindo, OT   10/16/2018

## 2018-10-16 NOTE — PLAN OF CARE
"Problem: Physical Therapy Goal  Goal: Physical Therapy Goal  Goals to be met by: 10/30/18     Patient will increase functional independence with mobility by performin. Supine to sit with supervision.   2. Sit to supine with supervision.   3. Sit<>stand transfer with supervision using rolling walker.   4. Gait > 150 feet with SBA using rolling walker.   5. Ascend/descend 6" curb step  with CGA with RW  Outcome: Ongoing (interventions implemented as appropriate)  PT eval.  Pt performed TKR ex. amb with RW and CGA. anticipate dc tomorrow   REC:  Home with HH  DME:  3 in 1 , RW       "

## 2018-10-17 VITALS
OXYGEN SATURATION: 96 % | RESPIRATION RATE: 18 BRPM | SYSTOLIC BLOOD PRESSURE: 156 MMHG | DIASTOLIC BLOOD PRESSURE: 76 MMHG | WEIGHT: 220.38 LBS | HEIGHT: 72 IN | HEART RATE: 68 BPM | BODY MASS INDEX: 29.85 KG/M2 | TEMPERATURE: 97 F

## 2018-10-17 LAB
ERYTHROCYTE [DISTWIDTH] IN BLOOD BY AUTOMATED COUNT: 13 %
HCT VFR BLD AUTO: 36.2 %
HGB BLD-MCNC: 12.1 G/DL
MCH RBC QN AUTO: 32 PG
MCHC RBC AUTO-ENTMCNC: 33.4 G/DL
MCV RBC AUTO: 96 FL
PLATELET # BLD AUTO: 128 K/UL
PMV BLD AUTO: 11.1 FL
RBC # BLD AUTO: 3.78 M/UL
WBC # BLD AUTO: 8.87 K/UL

## 2018-10-17 PROCEDURE — 97535 SELF CARE MNGMENT TRAINING: CPT

## 2018-10-17 PROCEDURE — 25000003 PHARM REV CODE 250: Performed by: ORTHOPAEDIC SURGERY

## 2018-10-17 PROCEDURE — 25000003 PHARM REV CODE 250: Performed by: NURSE PRACTITIONER

## 2018-10-17 PROCEDURE — 63600175 PHARM REV CODE 636 W HCPCS: Performed by: ORTHOPAEDIC SURGERY

## 2018-10-17 PROCEDURE — 36415 COLL VENOUS BLD VENIPUNCTURE: CPT

## 2018-10-17 PROCEDURE — 97165 OT EVAL LOW COMPLEX 30 MIN: CPT

## 2018-10-17 PROCEDURE — 97110 THERAPEUTIC EXERCISES: CPT

## 2018-10-17 PROCEDURE — 85027 COMPLETE CBC AUTOMATED: CPT

## 2018-10-17 PROCEDURE — 97116 GAIT TRAINING THERAPY: CPT

## 2018-10-17 RX ORDER — ASPIRIN 81 MG/1
81 TABLET ORAL 2 TIMES DAILY
Qty: 28 TABLET | Refills: 0 | Status: SHIPPED | OUTPATIENT
Start: 2018-10-17 | End: 2018-10-31

## 2018-10-17 RX ADMIN — CEFAZOLIN SODIUM 2 G: 1 SOLUTION INTRAVENOUS at 10:10

## 2018-10-17 RX ADMIN — CEFAZOLIN SODIUM 2 G: 1 SOLUTION INTRAVENOUS at 04:10

## 2018-10-17 RX ADMIN — OXYCODONE HYDROCHLORIDE 5 MG: 5 TABLET ORAL at 05:10

## 2018-10-17 RX ADMIN — LISINOPRIL 10 MG: 10 TABLET ORAL at 09:10

## 2018-10-17 RX ADMIN — DOCUSATE SODIUM 100 MG: 100 CAPSULE, LIQUID FILLED ORAL at 09:10

## 2018-10-17 RX ADMIN — LEVOTHYROXINE SODIUM 75 MCG: 75 TABLET ORAL at 05:10

## 2018-10-17 RX ADMIN — OXYCODONE HYDROCHLORIDE 5 MG: 5 TABLET ORAL at 10:10

## 2018-10-17 RX ADMIN — FAMOTIDINE 20 MG: 20 TABLET ORAL at 09:10

## 2018-10-17 RX ADMIN — SOTALOL HYDROCHLORIDE 40 MG: 80 TABLET ORAL at 09:10

## 2018-10-17 RX ADMIN — ENOXAPARIN SODIUM 40 MG: 100 INJECTION SUBCUTANEOUS at 05:10

## 2018-10-17 NOTE — PT/OT/SLP EVAL
"Occupational Therapy   Evaluation and Discharge Note    Name: Aníbal Anguiano  MRN: [id number]  Admitting Diagnosis:  Primary osteoarthritis of left knee 1 Day Post-Op    Recommendations:     Discharge Recommendations: home with home health  Discharge Equipment Recommendations:  3-in-1 commode, walker, rolling  Barriers to discharge:  None    History:     Occupational Profile:  Per PT and confirmation with pt: "Lives with wife in 1 story with 6" [employer].  Has tub shower combo  with bars and 1 handicap toilet and 1 low toilet  Prior to admission, patients level of function was I PTA for amb and ADL, no AD, drives, works doing manual labor on trucks, [employer].  Patient uses the following equipment: none.  DME owned (not currently used): none.  Upon discharge, patient will have assistance from wife-she is retired."    Past Medical History:   Diagnosis Date    Anticoagulant long-term use     eliquis    Atrial fibrillation     Bleeding ulcer     Encounter for blood transfusion     Hypertension     Thyroid disease     hypothyroid       Past Surgical History:   Procedure Laterality Date    ARTHROSCOPY OF SHOULDER WITH DECOMPRESSION OF SUBACROMIAL SPACE Right 6/12/2018    Procedure: ARTHROSCOPY-SHOULDER WITH SUBACROMIAL DECOMPRESSION;  Surgeon: Melvin Camargo MD;  [facility];  Service: Orthopedics;  Laterality: Right;    ARTHROSCOPY-SHOULDER Right 6/12/2018    Performed by [facility]argo [facility] at [facility] OR    ARTHROSCOPY-SHOULDER WITH SUBACROMIAL DECOMPRESSION Right 6/12/2018    Performed by [facility] at [facility] OR    CLAVICULECTOMY, DISTAL-DCE Right 6/12/2018    [facility]formed by [facility] at [facility] OR    DISTAL CLAVICLE EXCISION Right 6/12/2018    Procedure: CLAVICULECTOMY, DISTAL-DCE;  Surgeon: Melvin Camargo MD;  Location: [facility];  Service: Orthopedics;  Laterality: Right;    KNEE SURGERY Left 1971    REPAIR-ROTATOR CUFF Right 6/12/2018    Performed by [facility]argo [facility] at [facility] OR    ROTATOR CUFF " REPAIR Right 6/12/2018    Procedure: REPAIR-ROTATOR CUFF;  Surgeon: Melvin Camargo MD;  Location: [facility] OR;  Service: Orthopedics;  Laterality: Right;    SHOULDER ARTHROSCOPY Right 6/12/2018    Procedure: ARTHROSCOPY-SHOULDER;  Surgeon: Melvin Camargo MD;  Location: [facility];  Service: Orthopedics;  Laterality: Right;  BLOCK    TRIGGER FINGER RELEASE         Subjective     Chief Complaint: none during this session  Patient/Family stated goals: Pt eager to return home and to work.   Communicated with: nursing prior to session.  Pain/Comfort:  · Pain Rating 1: 0/10  · Pain Rating Post-Intervention 1: 0/10    Patients cultural, spiritual, Congregational conflicts given the current situation: none specified    Objective:     Patient found with: cryotherapy, peripheral IV    General Precautions: Standard, fall   Orthopedic Precautions:LLE weight bearing as tolerated   Braces: N/A     Occupational Performance:    Bed Mobility:    · Patient completed Supine to Sit with modified independence    Functional Mobility/Transfers:  · Patient completed Sit <> Stand Transfer with stand by assistance  with  rolling walker   · Patient completed Bed <> Chair Transfer using Step Transfer technique with stand by assistance with rolling walker  · Functional Mobility: With rolling walker SBA around hospital room using rolling walker with no LOB.     Activities of Daily Living:  · Upper Body Dressing: modified independence donning shirt  · Lower Body Dressing: Pt had shorts on upon OT arrival. Pt stated he had assistance from wife to siria pants after standing to lift up underwear. Educated pt on dressing from seated position and appropriate safety precautions and use of rolling walker.      Cognitive/Visual Perceptual:  Cognitive/Psychosocial Skills:     -       Oriented to: Person, Place and Time   -       Follows Commands/attention:Follows two-step commands  -       Communication: clear/fluent  -       Memory: No Deficits noted  -        "Safety awareness/insight to disability: intact   -       Mood/Affect/Coping skills/emotional control: Appropriate to situation  Visual/Perceptual:      -Intact     Physical Exam:  Postural examination/scapula alignment:    -       Rounded shoulders  -       Forward head  Skin integrity: Visible skin intact  Edema:  none noted BUE's  Sensation:    -       Intact   Motor Planning:    -       Good  Upper Extremity Range of Motion:     -       Right Upper Extremity: WFL   -       Left Upper Extremity: WFL    Upper Extremity Strength:    -       Right Upper Extremity: WFL  -       Left Upper Extremity: WFL    Strength:    -       Right Upper Extremity: WFL   -       Left Upper Extremity: WFL   Fine Motor Coordination:    -       Intact    Patient left up in chair with call button in reach, nursing notified and spouse present    [facility] 6 Click:  [facility] Total Score: 21    Treatment & Education:  Bed mobility, education on OT role and POC.   Education:    Assessment:     Aníbal Anguiano is a 68 y.o. male with a medical diagnosis of Primary osteoarthritis of left knee. OT evaluation completed and treatment initiated.  Pt presents with sufficient safety and independence in ADL's and functional mobility and no longer requires acute OT services at this time. Recommending home with home health OT/PT upon discharge.     Clinical Decision Makin.  OT Low:  "Pt evaluation falls under low complexity for evaluation coding due to performance deficits noted in 1-3 areas as stated above and 0 co-morbities affecting current functional status. Data obtained from problem focused assessments. No modifications or assistance was required for completion of evaluation. Only brief occupational profile and history review completed."     Plan:     During this hospitalization, patient does not require further acute OT services.  Please re-consult if situation changes.    · Plan of Care Reviewed with: patient, spouse    This Plan of care " has been discussed with the patient who was involved in its development and understands and is in agreement with the identified goals and treatment plan    GOALS:   Multidisciplinary Problems     Occupational Therapy Goals     Not on file          Multidisciplinary Problems (Resolved)        Problem: Occupational Therapy Goal    Goal Priority Disciplines Outcome Interventions   Occupational Therapy Goal   (Resolved)     OT, PT/OT Outcome(s) achieved                    Time Tracking:     OT Date of Treatment: 10/17/18  OT Start Time: 0758  OT Stop Time: 0820  OT Total Time (min): 22 min    Billable Minutes:Evaluation 12  Self Care/Home Management 10    Benny Galindo OT  10/17/2018

## 2018-10-17 NOTE — PLAN OF CARE
Problem: Occupational Therapy Goal  Goal: Occupational Therapy Goal  Outcome: Outcome(s) achieved Date Met: 10/17/18  OT evaluation completed and treatment initiated.  Pt presents with sufficient safety and independence in ADL's and functional mobility and no longer requires acute OT services at this time. Recommending home with home health OT/PT upon discharge.

## 2018-10-17 NOTE — NURSING
Discharge instructions reviewed with pt at length and verbalized 100% understanding. Extra pair thigh high lilo hose sent home with pt. ASA will not be given ,pt on eliquis per Dr. Gutierrez.Cpm machine initiated and tolerating well. Surgical dsg removed with small amt blood. Incision well approximated with sutures intact. Aquacel foam dsg applied then thigh high lilo hose.

## 2018-10-17 NOTE — PROGRESS NOTES
Patient is postop day 1.  From a left total knee arthroplasty.  Overall he is doing well and did ambulate quite well yesterday.  He wants to go home this afternoon.  Left knee dressing intact neurovascular status stable.  Hemoglobin 12 hematocrit 36.  Plan:  Discharge home today after physical therapy.            Dressing changed today prior to discharge.            Home Health arranged for gait training and range of motion to left knee.            Low dose aspirin regimen discussed for DVT prophylaxis.

## 2018-10-17 NOTE — PT/OT/SLP DISCHARGE
"Physical Therapy Discharge Summary    Name: Aníbal Anguiano  MRN: [id number]   Principal Problem: Primary osteoarthritis of left knee     Patient Discharged from acute Physical Therapy on 10/17/18.  Please refer to prior PT noted date on 10/17/18 for functional status.     Assessment:     Goals partially met. Patient appropriate for care in another setting.    Objective:     GOALS:   Multidisciplinary Problems     Physical Therapy Goals        Problem: Physical Therapy Goal    Goal Priority Disciplines Outcome Goal Variances Interventions   Physical Therapy Goal     PT, PT/OT Ongoing (interventions implemented as appropriate)     Description:  Goals to be met by: 10/30/18     Patient will increase functional independence with mobility by performin. Supine to sit with supervision.   2. Sit to supine with supervision. -MET 10/17/18  3. Sit<>stand transfer with supervision using rolling walker. -MET 10/17/18  4. Gait > 150 feet with SBA using rolling walker. MET 10/17/18  5. Ascend/descend 6" curb step  with CGA with RW MET 10/17/18                    Reasons for Discontinuation of Therapy Services  Transfer to alternate level of care.      Plan:     Patient Discharged to: Home with Home Health Service.    Kate Landeros, PT  10/17/2018  "

## 2018-10-17 NOTE — PT/OT/SLP PROGRESS
Physical Therapy Treatment    Patient Name:  Aníbal Anguiano   MRN:  [id number]    Recommendations:     Discharge Recommendations:  home with home health, home health OT, home health PT   Discharge Equipment Recommendations: 3-in-1 commode, walker, rolling   Barriers to discharge: None    Assessment:     Aníbal Anguiano is a 68 y.o. male admitted with a medical diagnosis of Primary osteoarthritis of left knee.  He presents with the following impairments/functional limitations:  weakness, impaired endurance, impaired balance, impaired functional mobilty, decreased ROM, decreased lower extremity function, gait instability . progressing toward goals.  Dc after am session.    Rehab Prognosis:  excellent; patient would benefit from acute skilled PT services to address these deficits and reach maximum level of function.      Recent Surgery: Procedure(s) (LRB):  ARTHROPLASTY, KNEE (Left) Day of Surgery    Plan:     During this hospitalization, patient to be seen BID to address the above listed problems via gait training, therapeutic activities, therapeutic exercises  · Plan of Care Expires:  11/15/18   Plan of Care Reviewed with: spouse    Subjective     Communicated with nursing prior to session.  Patient found up in chair upon PT entry to room, agreeable to treatment.      Chief Complaint: none stated  Patient comments/goals: states he had dinner while up in chair  Pain/Comfort:  · Pain Rating 1: 0/10  · Location - Side 1: Left  · Location - Orientation 1: generalized  · Location 1: knee  · Pain Addressed 1: Pre-medicate for activity, Reposition, Distraction, Cessation of Activity, Nurse notified  · Pain Rating Post-Intervention 1: 1/10    Patients cultural, spiritual, Mandaen conflicts given the current situation: none stated    Objective:     Patient found with: cryotherapy, FCD, cline catheter, peripheral IV     General Precautions: Standard, fall   Orthopedic Precautions:LLE weight bearing as tolerated   Braces: N/A  "    Functional Mobility:  · Bed Mobility:     · Sit to Supine: stand by assistance  · Transfers:     · Sit to Stand:  stand by assistance with rolling walker  · Gait: pt amb 280' with RW and CGA/SBA.  decreased tiff, cueing for sequence and walker management'  · Balance: fair standing      AM-PAC 6 CLICK MOBILITY  Turning over in bed (including adjusting bedclothes, sheets and blankets)?: 3  Sitting down on and standing up from a chair with arms (e.g., wheelchair, bedside commode, etc.): 3  Moving from lying on back to sitting on the side of the bed?: 3  Moving to and from a bed to a chair (including a wheelchair)?: 3  Need to walk in hospital room?: 3  Climbing 3-5 steps with a railing?: 3  Basic Mobility Total Score: 18       Therapeutic Activities and Exercises:   gait as above  TKR as prior session    Patient left HOB elevated with all lines intact, call button in reach, nursing notified and wife present..    GOALS:   Multidisciplinary Problems     Physical Therapy Goals        Problem: Physical Therapy Goal    Goal Priority Disciplines Outcome Goal Variances Interventions   Physical Therapy Goal     PT, PT/OT Ongoing (interventions implemented as appropriate)     Description:  Goals to be met by: 10/30/18     Patient will increase functional independence with mobility by performin. Supine to sit with supervision.   2. Sit to supine with supervision.   3. Sit<>stand transfer with supervision using rolling walker.   4. Gait > 150 feet with SBA using rolling walker.   5. Ascend/descend 6" curb step  with CGA with RW                    Time Tracking:     PT Received On: 10/16/18  PT Start Time:      PT Stop Time:   PT Total Time (min): 26 min     Billable Minutes: Gait Training 12 and Therapeutic Exercise 14    Treatment Type: Treatment  PT/PTA: PT     PTA Visit Number: 0     Kate Landeros, PT  10/16/2018  "

## 2018-10-17 NOTE — PT/OT/SLP PROGRESS
Physical Therapy Treatment    Patient Name:  Aníbal Anguiano   MRN:  [id number]    Recommendations:     Discharge Recommendations:  home with home health, home health PT, home health OT   Discharge Equipment Recommendations: 3-in-1 commode, walker, rolling   Barriers to discharge: None (pt will have assistance from wife)    Assessment:     Aníbal Anguiano is a 68 y.o. male admitted with a medical diagnosis of Primary osteoarthritis of left knee.  He presents with the following impairments/functional limitations:  weakness, impaired functional mobilty, gait instability, impaired balance, pain, edema, impaired skin, decreased ROM, decreased lower extremity function, orthopedic precautions ; pt with good mobility today, NO LOB or SOB noted, good ROM (~90* seated knee flexion).    Rehab Prognosis:  excellent; patient would benefit from acute skilled PT services to address these deficits and reach maximum level of function.      Recent Surgery: Procedure(s) (LRB):  ARTHROPLASTY, KNEE (Left) 1 Day Post-Op    Plan:     During this hospitalization, patient to be seen BID to address the above listed problems via gait training, therapeutic activities, therapeutic exercises  · Plan of Care Expires:  11/15/18   Plan of Care Reviewed with: patient, spouse    Subjective     Communicated with nurse prior to session.  Patient found sitting up inrecliner  upon PT entry to room, agreeable to treatment.      Chief Complaint: mod c/o's pain with ROM ex's  Patient comments/goals: pt agreeable to tx session, ready to go home today  Pain/Comfort:  · Pain Rating 1: 3/10(at rest)  · Location - Side 1: Left  · Location - Orientation 1: generalized  · Location 1: knee  · Pain Addressed 1: Pre-medicate for activity, Reposition, Distraction, Nurse notified  · Pain Rating Post-Intervention 1: 5/10(with ROM ex's)    Patients cultural, spiritual, Rastafarian conflicts given the current situation: none stated    Objective:     Patient found with:  "cryotherapy, peripheral IV     General Precautions: Standard, fall   Orthopedic Precautions:LLE weight bearing as tolerated   Braces: N/A     Functional Mobility:  · Bed Mobility:     · Sit to Supine: supervision  · Transfers:     · Sit to Stand:  supervision with rolling walker  · Gait: amb'd 200' x 2 with RW and SBA, WBAT on LLE  · Stairs:  Pt ascended/descended 6" curb step with Rolling Walker with no handrails with Stand-by Assistance.       AM-PAC 6 CLICK MOBILITY  Turning over in bed (including adjusting bedclothes, sheets and blankets)?: 4  Sitting down on and standing up from a chair with arms (e.g., wheelchair, bedside commode, etc.): 3  Moving from lying on back to sitting on the side of the bed?: 4  Moving to and from a bed to a chair (including a wheelchair)?: 3  Need to walk in hospital room?: 3  Climbing 3-5 steps with a railing?: 3  Basic Mobility Total Score: 20       Therapeutic Activities and Exercises:   reviewed HEP with pt and wife, perf'd supine AP's, QS,GS, SLR's, hip abd/add, heelslides x 10 ea. ; seated hip flex, LAQ's, heelslides x 10 ea.   Reviewed safety in home as well    Patient left supine with all lines intact, call button in reach and nurse and wife present..    GOALS:   Multidisciplinary Problems     Physical Therapy Goals        Problem: Physical Therapy Goal    Goal Priority Disciplines Outcome Goal Variances Interventions   Physical Therapy Goal     PT, PT/OT Ongoing (interventions implemented as appropriate)     Description:  Goals to be met by: 10/30/18     Patient will increase functional independence with mobility by performin. Supine to sit with supervision.   2. Sit to supine with supervision.   3. Sit<>stand transfer with supervision using rolling walker.   4. Gait > 150 feet with SBA using rolling walker.   5. Ascend/descend 6" curb step  with CGA with RW                    Time Tracking:     PT Received On: 10/17/18  PT Start Time: 0930     PT Stop Time: 1001  PT " Total Time (min): 31 min     Billable Minutes: Gait Training 16 and Therapeutic Exercise 15    Treatment Type: Treatment  PT/PTA: PTA     PTA Visit Number: 1     Juliet Rolle PTA  10/17/2018

## 2018-10-17 NOTE — PROGRESS NOTES
IM  Progress Note    Admit Date: 10/16/2018   LOS: 1 day     SUBJECTIVE:     Follow-up For:  Primary osteoarthritis of left knee    Interval History:     POD #1 left knee repair. Seen in room, up in chair for eval. Denies CP,SOB,N,V, or calf tenderness.  Voiding with ease, will d/c this AM.    Review of Systems:  Constitutional: No fever or chills  Respiratory: No cough or shortness of breath  Cardiovascular: No chest pain or palpitations  Gastrointestinal: No nausea or vomiting  Neurological: No confusion or weakness    OBJECTIVE:     Vital Signs Range (Last 24H):  BP (!) 156/76   Pulse 62   Temp 97 °F (36.1 °C)   Resp 18   Ht 6' (1.829 m)   Wt 100 kg (220 lb 6.3 oz)   SpO2 96%   BMI 29.89 kg/m²     Temp:  [96 °F (35.6 °C)-98.6 °F (37 °C)]   Pulse:  [45-69]   Resp:  [16-18]   BP: (142-180)/(66-87)   SpO2:  [95 %-100 %]     I & O (Last 24H):    Intake/Output Summary (Last 24 hours) at 10/17/2018 0839  Last data filed at 10/17/2018 0542  Gross per 24 hour   Intake 1665.7 ml   Output 2275 ml   Net -609.3 ml       Physical Exam:  General appearance: Well developed, well nourished  Eyes:  Conjunctivae/corneas clear. PERRL.  Lungs: Normal respiratory effort,   clear to auscultation bilaterally   Heart: Regular rate and rhythm, S1, S2 normal, no murmur, rub or andrew.  Abdomen: Soft, non-tender non-distended; bowel sounds normal; no masses,  no organomegaly  Extremities: No cyanosis or clubbing. No edema.  +2 pulses BLE  Skin: Skin color, texture, turgor normal. No rashes or lesions, ACE wrap left knee CDI  Neurologic: Normal strength and tone. No focal numbness or weakness     Laboratory Data:  Recent Labs   Lab  10/17/18   0442   WBC  8.87   RBC  3.78*   HGB  12.1*   HCT  36.2*   PLT  128*   MCV  96   MCH  32.0*   MCHC  33.4       BMP:   Recent Labs   Lab  10/16/18   0839   GLU  97   NA  140   K  4.3   CL  109   CO2  25   BUN  19   CREATININE  1.2   CALCIUM  9.2     Lab Results   Component Value Date    CALCIUM  9.2 10/16/2018               Medications:  Medication list was reviewed and changes noted under Assessment/Plan.    Diagnostic Results:        ASSESSMENT/PLAN:     1. Left knee repair (M17.12): POD #1, per therapy and ortho teams  2. HTN (I10): home meds with hold parameters  3. A-fib (I48.91): continue sotolol, monitor on tele. Per Dr. Schwarz  4. Hypothyroidism (E07.9): continue home med  5. Hyperlipidemia (E78.5): continue home med  6. Anemia (D62): noted on labs. Continued today. Likely exacerbated by procedure.  Defer to PCP.  7. Low platelets (D69.6): continued today. Recommend prompt follow up with PCP.  8. DVT prophy: lovenox, LITZY and SCD per ortho.    Medically stable for discharge

## 2018-10-19 NOTE — DISCHARGE SUMMARY
DATE OF ADMISSION:  10/16/2018    DATE OF DISCHARGE:  10/17/2018    SURGEON:  Dr. Gutierrez.    PREOPERATIVE DIAGNOSIS:  Left knee tricompartmental osteoarthritis.    POSTOPERATIVE DIAGNOSES:  Left knee tricompartmental osteoarthritis.    PROCEDURE:  On 10/16/2018, left total knee arthroplasty.    HISTORY:  The patient is a 68-year-old male with a history of left knee   osteoarthritis, had significant bone-on-bone in the weightbearing compartments.    He was unable to walk more than a half block without severe pain.  He was   admitted for total knee arthroplasty.    HOSPITAL COURSE:  The patient underwent left total knee arthroplasty on   10/16/2018.  Postoperatively, he did well.  He had the dressing changed on   postop day #1.  He progressed in physical therapy with gait training and range   of motion.  He was discharged home on postop day #1 in good condition to have a   regular diet.  Med reconciliation form completed.  Followup Dr. Gutierrez in 2   weeks.  Home health arranged for gait training and range of motion to left knee.      TPF/HN  dd: 10/18/2018 07:59:32 (CDT)  td: 10/18/2018 23:24:19 (CDT)  Doc ID   #[id number]  Job ID #[id number]    CC:

## 2018-12-02 ENCOUNTER — PATIENT MESSAGE (OUTPATIENT)
Dept: ADMINISTRATIVE | Facility: OTHER | Age: 68
End: 2018-12-02

## (undated) DEVICE — Device

## (undated) DEVICE — ELECTRODE REM PLYHSV RETURN 9

## (undated) DEVICE — UNDERGLOVES BIOGEL PI SIZE 7.5

## (undated) DEVICE — BLADE SHAVER 4.5 6/BX

## (undated) DEVICE — NDL ARTHSCP MF SCORPION

## (undated) DEVICE — TAPE SURG MEDIPORE 6X72IN

## (undated) DEVICE — SEE MEDLINE ITEM 157131

## (undated) DEVICE — WAND COBLATION TURBOVAC 90

## (undated) DEVICE — DRAPE PLASTIC U 60X72

## (undated) DEVICE — SEALER BIPOLAR TISSUE 6.0

## (undated) DEVICE — PULSAVAC ZIMMER

## (undated) DEVICE — SUT MCRYL PLUS 4-0 PS2 27IN

## (undated) DEVICE — PADDING CAST SPECIALIST 6X4YD

## (undated) DEVICE — SOL 9P NACL IRR PIC IL

## (undated) DEVICE — GAUZE SPONGE 4'X4 12 PLY

## (undated) DEVICE — TAPE MEDIPORE 4IN X 2YDS

## (undated) DEVICE — SEE MEDLINE ITEM 152548

## (undated) DEVICE — CANNULA ARTHROSCOPIC

## (undated) DEVICE — GLOVE BIOGEL SKINSENSE PI 7.5

## (undated) DEVICE — PENCIL ELECTROSURG HOLST W/BLD

## (undated) DEVICE — STAPLER SKIN PROXIMATE WIDE

## (undated) DEVICE — SUT VICRYL PLUS 0 CT1 36IN

## (undated) DEVICE — SUT PROLENE 3-0 FS-1 MONO18

## (undated) DEVICE — SEE MEDLINE ITEM 152523

## (undated) DEVICE — GAUZE SPONGE 4X4 12PLY

## (undated) DEVICE — SUT PROLENE 3-0 30SH

## (undated) DEVICE — PAD SHOULDER CARE POLAR

## (undated) DEVICE — SEE MEDLINE ITEM 153151

## (undated) DEVICE — COVER BACK TABLE 72X21

## (undated) DEVICE — APPLICATOR CHLORAPREP ORN 26ML

## (undated) DEVICE — DRAPE INCISE IOBAN 2 23X17IN

## (undated) DEVICE — GLOVE BIOGEL SKINSENSE PI 7.0

## (undated) DEVICE — DRESSING XEROFORM FOIL PK 1X8

## (undated) DEVICE — SUPPORT SLING SHOT II MEDIUM

## (undated) DEVICE — SEE MEDLINE ITEM 152530

## (undated) DEVICE — SAGITTAL BLADE 24.46MM

## (undated) DEVICE — SYR 30CC LUER LOCK

## (undated) DEVICE — SUT ETHIBOND EXCEL 1 CTX 18

## (undated) DEVICE — PAD ABD 8X10 STERILE

## (undated) DEVICE — ALCOHOL 70% ISOP W/GREEN 16OZ

## (undated) DEVICE — DRESSING XEROFORM GAUZE 5X9

## (undated) DEVICE — SUT FIBERWIRE

## (undated) DEVICE — SHIELD FACE HALF DISP 50/BX

## (undated) DEVICE — UNDERGLOVES BIOGEL PI SZ 7 LF

## (undated) DEVICE — SUT STRATAFIX PDS 1 CTX 18IN

## (undated) DEVICE — KIT TRACTION SHLDR ST DISP LF

## (undated) DEVICE — UNDERGLOVES BIOGEL PI SIZE 8

## (undated) DEVICE — SOL LACTATED RINGERS 4000

## (undated) DEVICE — PAD KNEE POLAR XL

## (undated) DEVICE — SOL NACL 0.9% INJ 250ML BG

## (undated) DEVICE — SOL NACL IRR 3000ML

## (undated) DEVICE — PAD CAST SPECIALIST STRL 6

## (undated) DEVICE — SEE MEDLINE ITEM 146298

## (undated) DEVICE — BLADE GATOR 3.5 6 EACH/BOX

## (undated) DEVICE — SUT ETHIBOND 0 CR/CT-1 8-18

## (undated) DEVICE — SEE MEDLINE ITEM 146345

## (undated) DEVICE — GLOVE BIOGEL SKINSENSE PI 8.0

## (undated) DEVICE — DRAPE STERI INSTRUMENT 1018

## (undated) DEVICE — SEE MEDLINE ITEM 146231

## (undated) DEVICE — CEMENT BONE RDPQ 40G PDR 20ML: Type: IMPLANTABLE DEVICE | Status: NON-FUNCTIONAL

## (undated) DEVICE — SOL IRR NACL .9% 3000ML

## (undated) DEVICE — SEE MEDLINE ITEM 157150

## (undated) DEVICE — NDL SAFETY 22G X 1.5 ECLIPSE

## (undated) DEVICE — SUT VICRYL PLUS 2-0 CT1 18

## (undated) DEVICE — TUBE SET INFLOW/OUTFLOW

## (undated) DEVICE — DRAPE STERI U-SHAPED 47X51IN

## (undated) DEVICE — BANDAGE ACE ELASTIC 6"

## (undated) DEVICE — KIT EVACUATOR 3-SPRING 1/8 DRN